# Patient Record
Sex: FEMALE | Race: WHITE | Employment: PART TIME | URBAN - METROPOLITAN AREA
[De-identification: names, ages, dates, MRNs, and addresses within clinical notes are randomized per-mention and may not be internally consistent; named-entity substitution may affect disease eponyms.]

---

## 2017-06-26 ENCOUNTER — HOSPITAL ENCOUNTER (OUTPATIENT)
Dept: ULTRASOUND IMAGING | Facility: CLINIC | Age: 47
End: 2017-06-26
Attending: OBSTETRICS & GYNECOLOGY
Payer: COMMERCIAL

## 2017-06-26 ENCOUNTER — HOSPITAL ENCOUNTER (OUTPATIENT)
Dept: MAMMOGRAPHY | Facility: CLINIC | Age: 47
Discharge: HOME | End: 2017-06-26
Attending: OBSTETRICS & GYNECOLOGY | Admitting: OBSTETRICS & GYNECOLOGY
Payer: COMMERCIAL

## 2017-06-26 DIAGNOSIS — R92.8 ABNORMAL MAMMOGRAM: ICD-10-CM

## 2017-06-26 PROCEDURE — 76642 ULTRASOUND BREAST LIMITED: CPT | Mod: RT

## 2017-06-26 PROCEDURE — G0279 TOMOSYNTHESIS, MAMMO: HCPCS

## 2019-01-11 ENCOUNTER — OFFICE VISIT (OUTPATIENT)
Dept: URGENT CARE | Facility: URGENT CARE | Age: 49
End: 2019-01-11
Payer: COMMERCIAL

## 2019-01-11 VITALS
DIASTOLIC BLOOD PRESSURE: 74 MMHG | HEIGHT: 70 IN | HEART RATE: 92 BPM | SYSTOLIC BLOOD PRESSURE: 118 MMHG | WEIGHT: 268 LBS | BODY MASS INDEX: 38.37 KG/M2 | OXYGEN SATURATION: 98 % | TEMPERATURE: 97.9 F | RESPIRATION RATE: 16 BRPM

## 2019-01-11 DIAGNOSIS — L03.031 PARONYCHIA OF TOE, RIGHT: Primary | ICD-10-CM

## 2019-01-11 PROCEDURE — 99203 OFFICE O/P NEW LOW 30 MIN: CPT | Performed by: PHYSICIAN ASSISTANT

## 2019-01-11 RX ORDER — SERTRALINE HYDROCHLORIDE 100 MG/1
100 TABLET, FILM COATED ORAL DAILY
COMMUNITY
Start: 2018-12-26 | End: 2019-12-30

## 2019-01-11 RX ORDER — DEXTROAMPHETAMINE SACCHARATE, AMPHETAMINE ASPARTATE, DEXTROAMPHETAMINE SULFATE AND AMPHETAMINE SULFATE 3.75; 3.75; 3.75; 3.75 MG/1; MG/1; MG/1; MG/1
20 TABLET ORAL 3 TIMES DAILY
COMMUNITY
Start: 2018-09-26

## 2019-01-11 RX ORDER — ARIPIPRAZOLE 10 MG/1
15 TABLET ORAL DAILY
Refills: 3 | COMMUNITY
Start: 2018-12-17

## 2019-01-11 RX ORDER — CEPHALEXIN 500 MG/1
500 CAPSULE ORAL 4 TIMES DAILY
Qty: 28 CAPSULE | Refills: 0 | Status: SHIPPED | OUTPATIENT
Start: 2019-01-11 | End: 2019-01-18

## 2019-01-11 RX ORDER — LAMOTRIGINE 100 MG/1
300 TABLET ORAL DAILY
Refills: 10 | COMMUNITY
Start: 2018-09-11

## 2019-01-11 RX ORDER — TRAZODONE HYDROCHLORIDE 150 MG/1
300 TABLET ORAL AT BEDTIME
COMMUNITY
Start: 2018-09-26

## 2019-01-11 ASSESSMENT — MIFFLIN-ST. JEOR: SCORE: 1930.89

## 2019-01-11 NOTE — PATIENT INSTRUCTIONS
Patient Education   If any worsening symptoms, redness, warmth or increased drainage start antibiotic  If symptoms do not improve at all in 2 days, start antibiotic.  Paronychia of the Finger or Toe  Paronychia is an infection near a fingernail or toenail. It usually occurs when an opening in the cuticle or an ingrown toenail lets bacteria under the skin.  The infection will need to be drained if pus is present. If the infection has been caught early, you may need only antibiotic treatment. Healing will take about 1 to 2 weeks.  Home care  Follow these guidelines when caring for yourself at home:    Clean and soak the toe or finger. Do this 2 times a day for the first 3 days. To do so:  ? Soak your foot or hand in a tub of warm water for 5 minutes. Or hold your toe or finger under a faucet of warm running water for 5 minutes.  ? Clean any crust away with soap and water using a cotton swab.  ? Put antibiotic ointment on the infected area.    Change the dressing daily or any time it gets dirty.    If you were given antibiotics, take them as directed until they are all gone.    If your infection is on a toe, wear comfortable shoes with a lot of toe room. You can also wear open-toed sandals while your toe heals.    You may use over-the-counter medicine (acetaminophen or ibuprofen to help with pain, unless another medicine was prescribed. If you have chronic liver or kidney disease, talk with your healthcare provider before using these medicines. Also talk with your provider if you've had a stomach ulcer or GI (gastrointestinal) bleeding.  Prevention  The following can prevent paronychia:    Avoid cutting or playing with your cuticles at home.    Don't bite your nails.    Don't suck on your thumbs or fingers.  Follow-up care  Follow up with your healthcare provider, or as advised.  When to seek medical advice  Call your healthcare provider right away if any of these occur:    Redness, pain, or swelling of the finger or  toe gets worse    Red streaks in the skin leading away from the wound    Pus or fluid draining from the nail area    Fever of 100.4 F (38 C) or higher, or as directed by your provider  Date Last Reviewed: 8/1/2016 2000-2018 [employer]. [address] [place], [place] [id number]. All rights reserved. This information is not intended as a substitute for professional medical care. Always follow your healthcare professional's instructions.

## 2019-01-11 NOTE — PROGRESS NOTES
"SUBJECTIVE:  Alejandrina Mack is a 48 year old female who presents complaining of right first toe pain.  She has noted some redness and swelling along the cuticle margin.  Symptoms began a week ago.   Severity: mild and symptoms are improving.  She denies any trauma to the area. She did note an ingrown toenail, which she clipped. She has been using triple antibiotic ointment and epsom salt soaks. No fevers or chills noted.  No migration of redness or swelling proximally. She has not had trauma to the area.     Past Medical History:   Diagnosis Date     Bipolar 2 disorder (H)      Current Outpatient Medications   Medication Sig Dispense Refill     ABILIFY 10 MG tablet Take 15 mg by mouth daily  3     amphetamine-dextroamphetamine (ADDERALL) 15 MG tablet Take 15 mg by mouth 3 times daily       cephALEXin (KEFLEX) 500 MG capsule Take 1 capsule (500 mg) by mouth 4 times daily for 7 days 28 capsule 0     lamoTRIgine (LAMICTAL) 100 MG tablet Take 300 mg by mouth At Bedtime  10     levonorgestrel (MIRENA) 20 MCG/24HR IUD 1 each by Intrauterine route once       sertraline (ZOLOFT) 100 MG tablet Take 100 mg by mouth daily       traZODone (DESYREL) 150 MG tablet Take 300 mg by mouth At Bedtime       Social History     Tobacco Use     Smoking status: Never Smoker     Smokeless tobacco: Never Used   Substance Use Topics     Alcohol use: Yes     Comment: rarely       ROS:  As stated above    OBJECTIVE:  /74 (BP Location: Right arm, Patient Position: Chair, Cuff Size: Adult Large)   Pulse 92   Temp 97.9  F (36.6  C) (Tympanic)   Resp 16   Ht 1.778 m (5' 10\")   Wt 121.6 kg (268 lb)   LMP  (LMP Unknown)   SpO2 98%   Breastfeeding? No   BMI 38.45 kg/m    General: WDWN  Hand exam:  examination of first great toe reveals paronychia with redness, tenderness and swelling along distal finger. No drainage is noted.     ASSESSMENT / PLAN:  1. Paronychia of toe, right  Paronychia is responding well to supportive cares. " Encouraged her to continue soaking and using abx ointment. A written rx was given for her to take if symptoms worsen (cellulitis, fever) or if symptoms do not improve. Patient understands and agrees with treatment plan.   - cephALEXin (KEFLEX) 500 MG capsule; Take 1 capsule (500 mg) by mouth 4 times daily for 7 days  Dispense: 28 capsule; Refill: 0    I have discussed the patient's diagnosis and my plan of treatment with the patient. Patient is aware to come back in with worsening symptoms or if no relief despite treatment plan.  Patient verbalizes understanding. All questions were addressed and answered.     Mariana Soto PA-C

## 2019-09-25 ENCOUNTER — HOSPITAL ENCOUNTER (OUTPATIENT)
Dept: MAMMOGRAPHY | Facility: CLINIC | Age: 49
End: 2019-09-25
Attending: SPECIALIST
Payer: COMMERCIAL

## 2019-09-25 ENCOUNTER — HOSPITAL ENCOUNTER (OUTPATIENT)
Dept: ULTRASOUND IMAGING | Facility: CLINIC | Age: 49
Discharge: HOME | End: 2019-09-25
Attending: SPECIALIST | Admitting: SPECIALIST
Payer: COMMERCIAL

## 2019-09-25 ENCOUNTER — HOSPITAL ENCOUNTER (OUTPATIENT)
Dept: ULTRASOUND IMAGING | Facility: CLINIC | Age: 49
End: 2019-09-25
Attending: SPECIALIST
Payer: COMMERCIAL

## 2019-09-25 DIAGNOSIS — R92.8 ABNORMAL MAMMOGRAM: ICD-10-CM

## 2019-09-25 PROCEDURE — 25000125 ZZHC RX 250: Performed by: RADIOLOGY

## 2019-09-25 PROCEDURE — 76642 ULTRASOUND BREAST LIMITED: CPT | Mod: RT

## 2019-09-25 PROCEDURE — 40000986 MA POST PROCEDURE RIGHT

## 2019-09-25 PROCEDURE — 88377 M/PHMTRC ALYS ISHQUANT/SEMIQ: CPT | Performed by: SPECIALIST

## 2019-09-25 PROCEDURE — 88342 IMHCHEM/IMCYTCHM 1ST ANTB: CPT | Mod: 26 | Performed by: SPECIALIST

## 2019-09-25 PROCEDURE — 88360 TUMOR IMMUNOHISTOCHEM/MANUAL: CPT | Mod: 26,59 | Performed by: SPECIALIST

## 2019-09-25 PROCEDURE — 88305 TISSUE EXAM BY PATHOLOGIST: CPT | Performed by: SPECIALIST

## 2019-09-25 PROCEDURE — 27210206 US BREAST BIOPSY CORE NEEDLE RIGHT

## 2019-09-25 PROCEDURE — 00000158 ZZHCL STATISTIC H-FISH PROCESS B/S: Performed by: SPECIALIST

## 2019-09-25 PROCEDURE — 88305 TISSUE EXAM BY PATHOLOGIST: CPT | Mod: 26 | Performed by: SPECIALIST

## 2019-09-25 PROCEDURE — 88342 IMHCHEM/IMCYTCHM 1ST ANTB: CPT | Performed by: SPECIALIST

## 2019-09-25 PROCEDURE — 00000159 ZZHCL STATISTIC H-SEND OUTS PREP: Performed by: SPECIALIST

## 2019-09-25 PROCEDURE — 88360 TUMOR IMMUNOHISTOCHEM/MANUAL: CPT | Performed by: SPECIALIST

## 2019-09-25 RX ADMIN — LIDOCAINE HYDROCHLORIDE 3 ML: 10 INJECTION, SOLUTION EPIDURAL; INFILTRATION; INTRACAUDAL; PERINEURAL at 14:23

## 2019-09-25 NOTE — DISCHARGE INSTRUCTIONS
Page 1 of 1  For informational purposes only. Not to replace the advice of your health care provider. Copyright   2010 [facility]. All rights reserved. [employer] [id number] - REV 02/16.  After Your Breast Biopsy   Bleeding or bruising  Slight bruising is normal. If you bleed through the bandage, put direct pressure on the breast for 10 minutes.   If the breast begins to swell, or you have a lot of bleeding after 10 minutes of pressure, call the doctor who ordered your exam. Or, go to the emergency room.   Bandages  Keep your bandage in place until tomorrow morning. Do not get it wet.   If you have small pieces of tape on the skin, leave them in place. They will fall off on their own, or you can remove them after 5 days.   Activity  You may shower the morning after the exam. No heavy activity (lifting, vacuuming) on the day of your exam. You may go back to normal activity the next day, unless you had a lot of bleeding or pain.  Discomfort  You may take Tylenol (acetaminophen) today for pain. Tomorrow, you may take an anti-inflammatory medicine (aspirin, ibuprofen, Motrin, Aleve, Advil), unless your doctor tells you not to.  Wear your bra overnight to support the breast. You may also use an ice pack: Place it over the area for 15-20 minutes several times a day.  Infection  Infection is rare. Symptoms include fever, redness, increasing pain and fluid draining from the biopsy site. If you have any of these symptoms, please call the doctor who ordered your exam.  Results  Results may take up to 5 business days. A nurse or doctor from the Breast Center will call with your results. We will also send the results to the doctor who ordered your biopsy.  If you have not heard your results in 5 days, please call the Breast Center.   Other instructions  ______________________________________________________________________________________________________________________________  Call your doctor if:    You have  bleeding that lasts more than 10 minutes.    You have pain that cannot be controlled.   You have signs of infection (fever, redness, drainage or other signs).   You have not received your results within 5 days.    Please call the Breast Center nurse navigator at [phone] if you have questions or concerns about your biopsy.

## 2019-09-27 ENCOUNTER — TELEPHONE (OUTPATIENT)
Dept: MAMMOGRAPHY | Facility: CLINIC | Age: 49
End: 2019-09-27

## 2019-09-27 NOTE — TELEPHONE ENCOUNTER
MALIGNANT path  Pathology report reviewed with breast radiologist Brad.  I phoned and informed patient of results showing Invasive lobular carcinoma.  Patient states no problems with biopsy site.  Recommended follow up is surgical consult and MRI.   Surgical Consult has been arranged with  Dr Ambriz on 9-30-19 at 0930 arriving at 0900.   Patient has directions and phone numbers.  Questions were answered and I explained my role as Nurse Navigator in assisting her with appointments, resources and social support. New diagnosis information packet will be available at consult. I will follow up with the patient. She has my phone number if she has further questions. Ordering provider notified.

## 2019-09-28 LAB — COPATH REPORT: NORMAL

## 2019-09-30 ENCOUNTER — TELEPHONE (OUTPATIENT)
Dept: SURGERY | Facility: CLINIC | Age: 49
End: 2019-09-30

## 2019-09-30 ENCOUNTER — OFFICE VISIT (OUTPATIENT)
Dept: SURGERY | Facility: CLINIC | Age: 49
End: 2019-09-30
Payer: COMMERCIAL

## 2019-09-30 ENCOUNTER — PREP FOR PROCEDURE (OUTPATIENT)
Dept: SURGERY | Facility: CLINIC | Age: 49
End: 2019-09-30

## 2019-09-30 VITALS
HEIGHT: 70 IN | WEIGHT: 268 LBS | DIASTOLIC BLOOD PRESSURE: 84 MMHG | RESPIRATION RATE: 16 BRPM | HEART RATE: 106 BPM | BODY MASS INDEX: 38.37 KG/M2 | OXYGEN SATURATION: 95 % | SYSTOLIC BLOOD PRESSURE: 122 MMHG

## 2019-09-30 DIAGNOSIS — Z17.0 MALIGNANT NEOPLASM OF UPPER-OUTER QUADRANT OF RIGHT BREAST IN FEMALE, ESTROGEN RECEPTOR POSITIVE (H): ICD-10-CM

## 2019-09-30 DIAGNOSIS — C50.411 MALIGNANT NEOPLASM OF UPPER-OUTER QUADRANT OF RIGHT BREAST IN FEMALE, ESTROGEN RECEPTOR POSITIVE (H): ICD-10-CM

## 2019-09-30 DIAGNOSIS — C50.911 MALIGNANT NEOPLASM OF RIGHT FEMALE BREAST, UNSPECIFIED ESTROGEN RECEPTOR STATUS, UNSPECIFIED SITE OF BREAST (H): Primary | ICD-10-CM

## 2019-09-30 PROCEDURE — 99202 OFFICE O/P NEW SF 15 MIN: CPT | Performed by: SURGERY

## 2019-09-30 ASSESSMENT — MIFFLIN-ST. JEOR: SCORE: 1925.89

## 2019-09-30 NOTE — TELEPHONE ENCOUNTER
Type of surgery: RIGHT BREAST SEED LOCALIZED LUMPECTOMY WITH RIGHT SENTINEL NODE BIOPSY, POSSIBLE RIGHT AXILLARY NODE DISSECTION   Location of surgery: [facility]  Date and time of surgery: 10-11-19, 10:45 AM   Surgeon: DR. CABEZAS   Pre-Op Appt Date: PATIENT TO SCHEDULE   Post-Op Appt Date: PATIENT TO SCHEDULE    Packet sent out: GIVEN TO PATIENT   Pre-cert/Authorization completed:  Not Applicable  Date: 9-30-19     RIGHT BREAST SEED LOCALIZED LUMPECTOMY WITH RIGHT SENTINEL NODE BIOPSY, POSSIBLE RIGHT AXILLARY NODE DISSECTION   GENERAL   PT INST TO HAVE H&P WITH DR. ALFREDO  90 MINS REQ  PA ASSIST DJM   ALW   R Seed loc at 8:45 am  alw   R Sn Inj at 9:45 am  alw

## 2019-09-30 NOTE — PROGRESS NOTES
"HPI:  She is here with her  and mother  She works as a   right breast mass noted on screening mammogram.      Skin rashes, dimpling or nipple changes:  none  Nipple discharge:   none  Does perform self breast exams.  Previous breast biopsies: No  Previous cyst aspiration: No  Previous other breast surgery: No    Family History   Problem Relation Age of Onset     Breast Cancer Maternal Grandmother 86     Diabetes Paternal Grandfather      Family history of breast cancer: Yes - MGM, ost menopausal  Family history of colon cancer: No  Family history of pancreatic cancer: No  Family history of prostate cancer: No    Mammography reveals: \"possible developing focal asymmetry with architectural distortion right breast\"    US reveals: right breast:  a nodule measuring 0.8 cm at the 10 o'clock position and 9 cm from the nipple, normal-appearing lymph nodes in the axilla.    Percutaneous core needle biopsy reveals: invasive lobular carcinoma, grade 1, ER & MA POSITIVE, HER-2 NEGATIVE and LCIS.  This pathology was reviewed with the patient and her family    Past Medical History:   has a past medical history of Bipolar 2 disorder (H).    Past Surgical History:  No past surgical history on file.     Social History:  Social History     Socioeconomic History     Marital status:      Spouse name: Not on file     Number of children: Not on file     Years of education: Not on file     Highest education level: Not on file   Occupational History     Not on file   Social Needs     Financial resource strain: Not on file     Food insecurity:     Worry: Not on file     Inability: Not on file     Transportation needs:     Medical: Not on file     Non-medical: Not on file   Tobacco Use     Smoking status: Never Smoker     Smokeless tobacco: Never Used   Substance and Sexual Activity     Alcohol use: Yes     Comment: rarely     Drug use: No     Sexual activity: Yes     Partners: Male     Birth " control/protection: I.U.D.   Lifestyle     Physical activity:     Days per week: Not on file     Minutes per session: Not on file     Stress: Not on file   Relationships     Social connections:     Talks on phone: Not on file     Gets together: Not on file     Attends [employer] service: Not on file     Active member of club or organization: Not on file     Attends meetings of clubs or organizations: Not on file     Relationship status: Not on file     Intimate partner violence:     Fear of current or ex partner: Not on file     Emotionally abused: Not on file     Physically abused: Not on file     Forced sexual activity: Not on file   Other Topics Concern     Not on file   Social History Narrative     Not on file       PE:  Vitals: There were no vitals taken for this visit.  General appearance: well-nourished, sitting comfortably, no apparent distress  Neck: Supple without thyromegaly, lymphadenopathy, masses  Lungs: Respirations unlabored  Abdomen: soft, nondistended  Extremities: Without edema  Neurologic: nonfocal, grossly intact times four extremities, alert and oriented times three  Psychiatric: Mood and affect are appropriate  Skin: Without lesions or rashes  Breast:     Masses- none    Ecchymosis- none   Incisional scar- none    Axillae:   Palpable adenopathy: none    Assessment: Right breast cancer-invasive lobular, ER and KS positive, HER-2 negative    PLAN:  Discussed options for and treatment of his breast cancer were discussed in detail.  We discussed, compared and contrasted lumpectomy with radiation and mastectomy with or without reconstruction.  Currently she is considering lumpectomy versus mastectomy but no reconstruction.  We discussed radiation therapy, cosmetic changes to the breast, changes from radiation therapy, recurrence risk and surveillance as well as reconstruction options.  We discussed sentinel node biopsy with axillary node dissection if the sentinel node is significantly positive or  fails to localize.  We discussed placement of a drain, lymphedema, axillary numbness and pain.  Due to her young age, we also discussed genetic testing, this would need to be done after an appointment with a genetic counselor and these results would likely not be available at the time of her initial surgery.  We discussed oncology referral and she would like to see Dr. Keke Shah at [facility], I have contacted her office so they can contact the patient for an intake evaluation.    Today, we will order an MRI scan as recommended by radiology and tentatively schedule surgery for next week.    Pradip Ambriz MD    Please route or send letter to:  Primary Care Provider (PCP) and Include Progress Note

## 2019-09-30 NOTE — PROGRESS NOTES
Breast Patients      BREAST PATIENTS (FEMALE)    At what age did your periods begin? 12-13 yrs     What was the date of your last menstrual period? IUD placed     Have you begun menopause? No    Are you using hormone replacement therapy?  No    Number of full-term pregnancies: 2    Did you nurse your children? Yes    Are you pregnant now? No    Do you have breast implants? No         BREAST PATIENTS (ALL)    Have you had a previous breast biopsy? Yes  Side: -  Date: 2015    Have you had previous Breast Cancer? No

## 2019-09-30 NOTE — PATIENT INSTRUCTIONS
BILATERAL BREAST MRI    Date: 10-2-19  Time: 2:00 pm     Location: [facility]  [id number] [place] [address]  [id number]        Please check in at 1:30 pm

## 2019-09-30 NOTE — LETTER
"2019       Re: Aljeandrina Mack - 1970    She is here with her  and mother  She works as a   right breast mass noted on screening mammogram.       Skin rashes, dimpling or nipple changes:  none  Nipple discharge:   none  Does perform self breast exams.  Previous breast biopsies: No  Previous cyst aspiration: No  Previous other breast surgery: No     Family history of breast cancer: Yes - MGM, ost menopausal  Family history of colon cancer: No  Family history of pancreatic cancer: No  Family history of prostate cancer: No     Mammography reveals: \"possible developing focal asymmetry with architectural distortion right breast\"     US reveals: right breast:  a nodule measuring 0.8 cm at the 10 o'clock position and 9 cm from the nipple, normal-appearing lymph nodes in the axilla.     Percutaneous core needle biopsy reveals: invasive lobular carcinoma, grade 1, ER & CO POSITIVE, HER-2 NEGATIVE and LCIS.  This pathology was reviewed with the patient and her family     Past Medical History:  has a past medical history of Bipolar 2 disorder (H).      PE:  Vitals: There were no vitals taken for this visit.  General appearance: well-nourished, sitting comfortably, no apparent distress  Neck: Supple without thyromegaly, lymphadenopathy, masses  Lungs: Respirations unlabored  Abdomen: soft, nondistended  Extremities: Without edema  Neurologic: nonfocal, grossly intact times four extremities, alert and oriented times three  Psychiatric: Mood and affect are appropriate  Skin: Without lesions or rashes  Breast:               Masses- none               Ecchymosis- none              Incisional scar- none     Axillae:   Palpable adenopathy: none     Assessment: Right breast cancer-invasive lobular, ER and CO positive, HER-2 negative     PLAN:  Discussed options for and treatment of his breast cancer were discussed in detail.  We discussed, compared and contrasted lumpectomy with radiation " and mastectomy with or without reconstruction.  Currently she is considering lumpectomy versus mastectomy but no reconstruction. We discussed radiation therapy, cosmetic changes to the breast, changes from radiation therapy, recurrence risk and surveillance as well as reconstruction options.  We discussed sentinel node biopsy with axillary node dissection if the sentinel node is significantly positive or fails to localize.  We discussed placement of a drain, lymphedema, axillary numbness and pain.  Due to her young age, we also discussed genetic testing, this would need to be done after an appointment with a genetic counselor and these results would likely not be available at the time of her initial surgery.  We discussed oncology referral and she would like to see Dr. Keke Shah at [facility], I have contacted her office so they can contact the patient for an intake evaluation.     Today, we will order an MRI scan as recommended by radiology and tentatively schedule surgery for next week.     Giles Ambriz MD

## 2019-10-01 LAB — COPATH REPORT: NORMAL

## 2019-10-02 ENCOUNTER — TRANSFERRED RECORDS (OUTPATIENT)
Dept: SURGERY | Facility: CLINIC | Age: 49
End: 2019-10-02

## 2019-10-02 ENCOUNTER — HOSPITAL ENCOUNTER (OUTPATIENT)
Dept: MRI IMAGING | Facility: CLINIC | Age: 49
Discharge: HOME | End: 2019-10-02
Attending: SURGERY | Admitting: SURGERY
Payer: COMMERCIAL

## 2019-10-02 DIAGNOSIS — C50.911 MALIGNANT NEOPLASM OF RIGHT FEMALE BREAST, UNSPECIFIED ESTROGEN RECEPTOR STATUS, UNSPECIFIED SITE OF BREAST (H): ICD-10-CM

## 2019-10-02 PROCEDURE — 77049 MRI BREAST C-+ W/CAD BI: CPT

## 2019-10-02 PROCEDURE — A9585 GADOBUTROL INJECTION: HCPCS | Performed by: SURGERY

## 2019-10-02 PROCEDURE — 25500064 ZZH RX 255 OP 636: Performed by: SURGERY

## 2019-10-02 RX ORDER — GADOBUTROL 604.72 MG/ML
15 INJECTION INTRAVENOUS ONCE
Status: COMPLETED | OUTPATIENT
Start: 2019-10-02 | End: 2019-10-02

## 2019-10-02 RX ADMIN — GADOBUTROL 12 ML: 604.72 INJECTION INTRAVENOUS at 13:54

## 2019-10-03 ENCOUNTER — TELEPHONE (OUTPATIENT)
Dept: MAMMOGRAPHY | Facility: CLINIC | Age: 49
End: 2019-10-03

## 2019-10-03 NOTE — TELEPHONE ENCOUNTER
Ms. Mack called the [facility] [place] regarding questions related to a MRI guided Breast Biopsy.  I explained the process and procedure involved with a MRI Guided breast biopsy.  I also gave Alejandrina the check in location for the Breast Center in [place], in the event her MD orders the biopsy. All questions answered.

## 2019-10-03 NOTE — RESULT ENCOUNTER NOTE
Patient was notified of these results.  Discussed these results with patient.  Also spoke with her oncologist, Dr. Shah.  The area of non-masslike enhancement on the right side would require a mastectomy for its complete removal.  However, since this is not definitively malignancy, she would like to proceed with lumpectomy and pathologic evaluation of some of the non-masslike enhancement which will be removed at the time of her lumpectomy surgery.  If this does indeed show malignancy, she may then need to undergo mastectomy.  She is comfortable with this plan.  Regarding the left breast, she is in the process of scheduling the MRI guided biopsy.  There is no time for her at [facility] so other sites are being queried.      Giles Ambriz MD  10/3/2019 1:21 PM

## 2019-10-08 ENCOUNTER — ANCILLARY PROCEDURE (OUTPATIENT)
Dept: MAMMOGRAPHY | Facility: CLINIC | Age: 49
End: 2019-10-08
Attending: INTERNAL MEDICINE
Payer: COMMERCIAL

## 2019-10-08 ENCOUNTER — ANCILLARY PROCEDURE (OUTPATIENT)
Dept: MRI IMAGING | Facility: CLINIC | Age: 49
End: 2019-10-08
Attending: INTERNAL MEDICINE
Payer: COMMERCIAL

## 2019-10-08 ENCOUNTER — ANESTHESIA EVENT (OUTPATIENT)
Dept: SURGERY | Facility: CLINIC | Age: 49
End: 2019-10-08
Payer: COMMERCIAL

## 2019-10-08 DIAGNOSIS — C50.912 MALIGNANT NEOPLASM OF LEFT FEMALE BREAST, UNSPECIFIED ESTROGEN RECEPTOR STATUS, UNSPECIFIED SITE OF BREAST (H): ICD-10-CM

## 2019-10-08 RX ORDER — LIDOCAINE HYDROCHLORIDE AND EPINEPHRINE 10; 10 MG/ML; UG/ML
10 INJECTION, SOLUTION INFILTRATION; PERINEURAL ONCE
Status: COMPLETED | OUTPATIENT
Start: 2019-10-08 | End: 2019-10-08

## 2019-10-08 RX ORDER — LIDOCAINE HYDROCHLORIDE 10 MG/ML
10 INJECTION, SOLUTION EPIDURAL; INFILTRATION; INTRACAUDAL; PERINEURAL ONCE
Status: COMPLETED | OUTPATIENT
Start: 2019-10-08 | End: 2019-10-08

## 2019-10-08 RX ORDER — GADOBUTROL 604.72 MG/ML
15 INJECTION INTRAVENOUS ONCE
Status: COMPLETED | OUTPATIENT
Start: 2019-10-08 | End: 2019-10-08

## 2019-10-08 RX ADMIN — GADOBUTROL 13 ML: 604.72 INJECTION INTRAVENOUS at 08:01

## 2019-10-08 RX ADMIN — LIDOCAINE HYDROCHLORIDE 10 ML: 10 INJECTION, SOLUTION EPIDURAL; INFILTRATION; INTRACAUDAL; PERINEURAL at 08:30

## 2019-10-08 RX ADMIN — LIDOCAINE HYDROCHLORIDE AND EPINEPHRINE 20 ML: 10; 10 INJECTION, SOLUTION INFILTRATION; PERINEURAL at 08:30

## 2019-10-08 NOTE — DISCHARGE INSTRUCTIONS
MRI Contrast Discharge Instructions    The IV contrast you received today will pass out of your body in your  urine. This will happen in the next 24 hours. You will not feel this process.  Your urine will not change color.    Drink at least 4 extra glasses of water or juice today (unless your doctor  has restricted your fluids). This reduces the stress on your kidneys.  You may take your regular medicines.    If you are on dialysis: It is best to have dialysis today.    If you have a reaction: Most reactions happen right away. If you have  any new symptoms after leaving the hospital (such as hives or swelling),  call your hospital at the correct number below. Or call your family doctor.  If you have breathing distress or wheezing, call 911.    Special instructions: ***    I have read and understand the above information.    Signature:______________________________________ Date:___________    Staff:__________________________________________ Date:___________     Time:__________    [facility] Departments:    ___[place]: [phone]  ___[facility]: [phone]  ___[place]: [phone] ___[facility]: [phone]  ___[facility]: [phone]  ___[facility]: [phone]  ___[phone]  ___[facility]: [phone]  ___[phone]

## 2019-10-09 ENCOUNTER — TRANSFERRED RECORDS (OUTPATIENT)
Dept: HEALTH INFORMATION MANAGEMENT | Facility: CLINIC | Age: 49
End: 2019-10-09

## 2019-10-09 LAB — COPATH REPORT: NORMAL

## 2019-10-10 ENCOUNTER — TELEPHONE (OUTPATIENT)
Dept: MAMMOGRAPHY | Facility: CLINIC | Age: 49
End: 2019-10-10

## 2019-10-10 NOTE — TELEPHONE ENCOUNTER
Spoke to Alejandrina about the benign findings from her left breast biopsy done earlier this week.  We discussed the Radiologist's recommendation of continuing on with her current treatment plan for her known right breast cancer.  Alejandrina verbalized understanding and all questions and concerns were answered at this time.

## 2019-10-11 ENCOUNTER — HOSPITAL ENCOUNTER (OUTPATIENT)
Dept: MAMMOGRAPHY | Facility: CLINIC | Age: 49
End: 2019-10-11
Attending: SURGERY | Admitting: SURGERY
Payer: COMMERCIAL

## 2019-10-11 ENCOUNTER — ANESTHESIA (OUTPATIENT)
Dept: SURGERY | Facility: CLINIC | Age: 49
End: 2019-10-11
Payer: COMMERCIAL

## 2019-10-11 ENCOUNTER — HOSPITAL ENCOUNTER (OUTPATIENT)
Dept: ULTRASOUND IMAGING | Facility: CLINIC | Age: 49
End: 2019-10-11
Attending: SURGERY | Admitting: SURGERY
Payer: COMMERCIAL

## 2019-10-11 ENCOUNTER — APPOINTMENT (OUTPATIENT)
Dept: MAMMOGRAPHY | Facility: CLINIC | Age: 49
End: 2019-10-11
Attending: SURGERY
Payer: COMMERCIAL

## 2019-10-11 ENCOUNTER — HOSPITAL ENCOUNTER (OUTPATIENT)
Dept: NUCLEAR MEDICINE | Facility: CLINIC | Age: 49
Setting detail: NUCLEAR MEDICINE
End: 2019-10-11
Attending: SURGERY
Payer: COMMERCIAL

## 2019-10-11 ENCOUNTER — HOSPITAL ENCOUNTER (OUTPATIENT)
Facility: CLINIC | Age: 49
Discharge: HOME | End: 2019-10-11
Attending: SURGERY | Admitting: SURGERY
Payer: COMMERCIAL

## 2019-10-11 ENCOUNTER — TELEPHONE (OUTPATIENT)
Dept: MAMMOGRAPHY | Facility: CLINIC | Age: 49
End: 2019-10-11

## 2019-10-11 ENCOUNTER — APPOINTMENT (OUTPATIENT)
Dept: SURGERY | Facility: PHYSICIAN GROUP | Age: 49
End: 2019-10-11
Payer: COMMERCIAL

## 2019-10-11 VITALS
SYSTOLIC BLOOD PRESSURE: 142 MMHG | HEART RATE: 94 BPM | RESPIRATION RATE: 14 BRPM | WEIGHT: 268 LBS | HEIGHT: 70 IN | OXYGEN SATURATION: 98 % | BODY MASS INDEX: 38.37 KG/M2 | DIASTOLIC BLOOD PRESSURE: 88 MMHG | TEMPERATURE: 97.7 F

## 2019-10-11 DIAGNOSIS — C50.911 MALIGNANT NEOPLASM OF RIGHT FEMALE BREAST, UNSPECIFIED ESTROGEN RECEPTOR STATUS, UNSPECIFIED SITE OF BREAST (H): ICD-10-CM

## 2019-10-11 DIAGNOSIS — Z17.0 MALIGNANT NEOPLASM OF UPPER-OUTER QUADRANT OF RIGHT BREAST IN FEMALE, ESTROGEN RECEPTOR POSITIVE (H): Primary | ICD-10-CM

## 2019-10-11 DIAGNOSIS — C50.411 MALIGNANT NEOPLASM OF UPPER-OUTER QUADRANT OF RIGHT BREAST IN FEMALE, ESTROGEN RECEPTOR POSITIVE (H): Primary | ICD-10-CM

## 2019-10-11 LAB — HCG UR QL: NEGATIVE

## 2019-10-11 PROCEDURE — 40000306 ZZH STATISTIC PRE PROC ASSESS II: Performed by: SURGERY

## 2019-10-11 PROCEDURE — 38900 IO MAP OF SENT LYMPH NODE: CPT | Performed by: SURGERY

## 2019-10-11 PROCEDURE — 88332 PATH CONSLTJ SURG EA ADD BLK: CPT | Mod: 26 | Performed by: SURGERY

## 2019-10-11 PROCEDURE — 25000125 ZZHC RX 250: Performed by: SURGERY

## 2019-10-11 PROCEDURE — 88307 TISSUE EXAM BY PATHOLOGIST: CPT | Mod: 26,59 | Performed by: SURGERY

## 2019-10-11 PROCEDURE — 25000128 H RX IP 250 OP 636: Performed by: NURSE ANESTHETIST, CERTIFIED REGISTERED

## 2019-10-11 PROCEDURE — 27210794 ZZH OR GENERAL SUPPLY STERILE: Performed by: SURGERY

## 2019-10-11 PROCEDURE — 25000125 ZZHC RX 250: Performed by: NURSE ANESTHETIST, CERTIFIED REGISTERED

## 2019-10-11 PROCEDURE — 71000012 ZZH RECOVERY PHASE 1 LEVEL 1 FIRST HR: Performed by: SURGERY

## 2019-10-11 PROCEDURE — 88307 TISSUE EXAM BY PATHOLOGIST: CPT | Performed by: SURGERY

## 2019-10-11 PROCEDURE — 36000058 ZZH SURGERY LEVEL 3 EA 15 ADDTL MIN: Performed by: SURGERY

## 2019-10-11 PROCEDURE — 88331 PATH CONSLTJ SURG 1 BLK 1SPC: CPT | Mod: 26 | Performed by: SURGERY

## 2019-10-11 PROCEDURE — 71000027 ZZH RECOVERY PHASE 2 EACH 15 MINS: Performed by: SURGERY

## 2019-10-11 PROCEDURE — 25800030 ZZH RX IP 258 OP 636: Performed by: ANESTHESIOLOGY

## 2019-10-11 PROCEDURE — 00000093 ZZHCL STATISTIC COURTESY CONSULT: Performed by: SURGERY

## 2019-10-11 PROCEDURE — 19285 PERQ DEV BREAST 1ST US IMAG: CPT | Mod: RT

## 2019-10-11 PROCEDURE — A9520 TC99 TILMANOCEPT DIAG 0.5MCI: HCPCS | Performed by: SURGERY

## 2019-10-11 PROCEDURE — 38525 BIOPSY/REMOVAL LYMPH NODES: CPT | Mod: RT | Performed by: SURGERY

## 2019-10-11 PROCEDURE — 88342 IMHCHEM/IMCYTCHM 1ST ANTB: CPT | Performed by: SURGERY

## 2019-10-11 PROCEDURE — 40000986 MA POST PROCEDURE RIGHT

## 2019-10-11 PROCEDURE — 37000008 ZZH ANESTHESIA TECHNICAL FEE, 1ST 30 MIN: Performed by: SURGERY

## 2019-10-11 PROCEDURE — 88342 IMHCHEM/IMCYTCHM 1ST ANTB: CPT | Mod: 26 | Performed by: SURGERY

## 2019-10-11 PROCEDURE — 25000128 H RX IP 250 OP 636: Performed by: SURGERY

## 2019-10-11 PROCEDURE — 36000060 ZZH SURGERY LEVEL 3 W FLUORO 1ST 30 MIN: Performed by: SURGERY

## 2019-10-11 PROCEDURE — 88329 PATH CONSLTJ DRG SURG: CPT | Performed by: SURGERY

## 2019-10-11 PROCEDURE — 19301 PARTIAL MASTECTOMY: CPT | Mod: RT | Performed by: SURGERY

## 2019-10-11 PROCEDURE — 38792 RA TRACER ID OF SENTINL NODE: CPT

## 2019-10-11 PROCEDURE — 40000268 MA BREAST SPECIMEN RIGHT OR

## 2019-10-11 PROCEDURE — 37000009 ZZH ANESTHESIA TECHNICAL FEE, EACH ADDTL 15 MIN: Performed by: SURGERY

## 2019-10-11 PROCEDURE — 88332 PATH CONSLTJ SURG EA ADD BLK: CPT | Performed by: SURGERY

## 2019-10-11 PROCEDURE — 81025 URINE PREGNANCY TEST: CPT | Performed by: ANESTHESIOLOGY

## 2019-10-11 PROCEDURE — 25000132 ZZH RX MED GY IP 250 OP 250 PS 637: Performed by: SURGERY

## 2019-10-11 PROCEDURE — 34300033 ZZH RX 343: Performed by: SURGERY

## 2019-10-11 PROCEDURE — 88331 PATH CONSLTJ SURG 1 BLK 1SPC: CPT | Performed by: SURGERY

## 2019-10-11 RX ORDER — ONDANSETRON 2 MG/ML
4 INJECTION INTRAMUSCULAR; INTRAVENOUS EVERY 30 MIN PRN
Status: DISCONTINUED | OUTPATIENT
Start: 2019-10-11 | End: 2019-10-11 | Stop reason: HOSPADM

## 2019-10-11 RX ORDER — KETOROLAC TROMETHAMINE 30 MG/ML
INJECTION, SOLUTION INTRAMUSCULAR; INTRAVENOUS PRN
Status: DISCONTINUED | OUTPATIENT
Start: 2019-10-11 | End: 2019-10-11

## 2019-10-11 RX ORDER — BUPIVACAINE HYDROCHLORIDE AND EPINEPHRINE 2.5; 5 MG/ML; UG/ML
INJECTION, SOLUTION EPIDURAL; INFILTRATION; INTRACAUDAL; PERINEURAL PRN
Status: DISCONTINUED | OUTPATIENT
Start: 2019-10-11 | End: 2019-10-11 | Stop reason: HOSPADM

## 2019-10-11 RX ORDER — HYDRALAZINE HYDROCHLORIDE 20 MG/ML
2.5-5 INJECTION INTRAMUSCULAR; INTRAVENOUS EVERY 10 MIN PRN
Status: DISCONTINUED | OUTPATIENT
Start: 2019-10-11 | End: 2019-10-11 | Stop reason: HOSPADM

## 2019-10-11 RX ORDER — MEPERIDINE HYDROCHLORIDE 50 MG/ML
12.5 INJECTION INTRAMUSCULAR; INTRAVENOUS; SUBCUTANEOUS EVERY 5 MIN PRN
Status: DISCONTINUED | OUTPATIENT
Start: 2019-10-11 | End: 2019-10-11 | Stop reason: HOSPADM

## 2019-10-11 RX ORDER — HYDROMORPHONE HYDROCHLORIDE 1 MG/ML
.3-.5 INJECTION, SOLUTION INTRAMUSCULAR; INTRAVENOUS; SUBCUTANEOUS EVERY 5 MIN PRN
Status: DISCONTINUED | OUTPATIENT
Start: 2019-10-11 | End: 2019-10-11 | Stop reason: HOSPADM

## 2019-10-11 RX ORDER — DIAZEPAM 10 MG/2ML
2.5 INJECTION, SOLUTION INTRAMUSCULAR; INTRAVENOUS
Status: DISCONTINUED | OUTPATIENT
Start: 2019-10-11 | End: 2019-10-11 | Stop reason: HOSPADM

## 2019-10-11 RX ORDER — CEFAZOLIN SODIUM IN 0.9 % NACL 3 G/100 ML
3 INTRAVENOUS SOLUTION, PIGGYBACK (ML) INTRAVENOUS
Status: COMPLETED | OUTPATIENT
Start: 2019-10-11 | End: 2019-10-11

## 2019-10-11 RX ORDER — PROPOFOL 10 MG/ML
INJECTION, EMULSION INTRAVENOUS CONTINUOUS PRN
Status: DISCONTINUED | OUTPATIENT
Start: 2019-10-11 | End: 2019-10-11

## 2019-10-11 RX ORDER — OXYCODONE HYDROCHLORIDE 5 MG/1
5 TABLET ORAL
Status: COMPLETED | OUTPATIENT
Start: 2019-10-11 | End: 2019-10-11

## 2019-10-11 RX ORDER — ISOSULFAN BLUE 50 MG/5ML
5 INJECTION, SOLUTION SUBCUTANEOUS ONCE
Status: COMPLETED | OUTPATIENT
Start: 2019-10-11 | End: 2019-10-11

## 2019-10-11 RX ORDER — NALOXONE HYDROCHLORIDE 0.4 MG/ML
.1-.4 INJECTION, SOLUTION INTRAMUSCULAR; INTRAVENOUS; SUBCUTANEOUS
Status: DISCONTINUED | OUTPATIENT
Start: 2019-10-11 | End: 2019-10-11 | Stop reason: HOSPADM

## 2019-10-11 RX ORDER — SODIUM CHLORIDE, SODIUM LACTATE, POTASSIUM CHLORIDE, CALCIUM CHLORIDE 600; 310; 30; 20 MG/100ML; MG/100ML; MG/100ML; MG/100ML
INJECTION, SOLUTION INTRAVENOUS CONTINUOUS
Status: DISCONTINUED | OUTPATIENT
Start: 2019-10-11 | End: 2019-10-11 | Stop reason: HOSPADM

## 2019-10-11 RX ORDER — FENTANYL CITRATE 50 UG/ML
25-50 INJECTION, SOLUTION INTRAMUSCULAR; INTRAVENOUS
Status: DISCONTINUED | OUTPATIENT
Start: 2019-10-11 | End: 2019-10-11 | Stop reason: HOSPADM

## 2019-10-11 RX ORDER — ONDANSETRON 2 MG/ML
INJECTION INTRAMUSCULAR; INTRAVENOUS PRN
Status: DISCONTINUED | OUTPATIENT
Start: 2019-10-11 | End: 2019-10-11

## 2019-10-11 RX ORDER — CEFAZOLIN SODIUM 1 G/3ML
1 INJECTION, POWDER, FOR SOLUTION INTRAMUSCULAR; INTRAVENOUS SEE ADMIN INSTRUCTIONS
Status: DISCONTINUED | OUTPATIENT
Start: 2019-10-11 | End: 2019-10-11 | Stop reason: HOSPADM

## 2019-10-11 RX ORDER — DIMENHYDRINATE 50 MG/ML
25 INJECTION, SOLUTION INTRAMUSCULAR; INTRAVENOUS
Status: DISCONTINUED | OUTPATIENT
Start: 2019-10-11 | End: 2019-10-11 | Stop reason: HOSPADM

## 2019-10-11 RX ORDER — PROPOFOL 10 MG/ML
INJECTION, EMULSION INTRAVENOUS PRN
Status: DISCONTINUED | OUTPATIENT
Start: 2019-10-11 | End: 2019-10-11

## 2019-10-11 RX ORDER — DEXAMETHASONE SODIUM PHOSPHATE 4 MG/ML
INJECTION, SOLUTION INTRA-ARTICULAR; INTRALESIONAL; INTRAMUSCULAR; INTRAVENOUS; SOFT TISSUE PRN
Status: DISCONTINUED | OUTPATIENT
Start: 2019-10-11 | End: 2019-10-11

## 2019-10-11 RX ORDER — LIDOCAINE HYDROCHLORIDE 10 MG/ML
INJECTION, SOLUTION INFILTRATION; PERINEURAL PRN
Status: DISCONTINUED | OUTPATIENT
Start: 2019-10-11 | End: 2019-10-11

## 2019-10-11 RX ORDER — OXYCODONE HYDROCHLORIDE 5 MG/1
5-10 TABLET ORAL EVERY 4 HOURS PRN
Qty: 12 TABLET | Refills: 0 | Status: SHIPPED | OUTPATIENT
Start: 2019-10-11 | End: 2019-10-24

## 2019-10-11 RX ORDER — ONDANSETRON 4 MG/1
4 TABLET, ORALLY DISINTEGRATING ORAL EVERY 30 MIN PRN
Status: DISCONTINUED | OUTPATIENT
Start: 2019-10-11 | End: 2019-10-11 | Stop reason: HOSPADM

## 2019-10-11 RX ORDER — GLYCOPYRROLATE 0.2 MG/ML
INJECTION, SOLUTION INTRAMUSCULAR; INTRAVENOUS PRN
Status: DISCONTINUED | OUTPATIENT
Start: 2019-10-11 | End: 2019-10-11

## 2019-10-11 RX ORDER — FENTANYL CITRATE 50 UG/ML
INJECTION, SOLUTION INTRAMUSCULAR; INTRAVENOUS PRN
Status: DISCONTINUED | OUTPATIENT
Start: 2019-10-11 | End: 2019-10-11

## 2019-10-11 RX ORDER — LABETALOL 20 MG/4 ML (5 MG/ML) INTRAVENOUS SYRINGE
10
Status: DISCONTINUED | OUTPATIENT
Start: 2019-10-11 | End: 2019-10-11 | Stop reason: HOSPADM

## 2019-10-11 RX ORDER — ALBUTEROL SULFATE 0.83 MG/ML
2.5 SOLUTION RESPIRATORY (INHALATION) EVERY 4 HOURS PRN
Status: DISCONTINUED | OUTPATIENT
Start: 2019-10-11 | End: 2019-10-11 | Stop reason: HOSPADM

## 2019-10-11 RX ADMIN — HYDROMORPHONE HYDROCHLORIDE 1 MG: 1 INJECTION, SOLUTION INTRAMUSCULAR; INTRAVENOUS; SUBCUTANEOUS at 10:49

## 2019-10-11 RX ADMIN — PROPOFOL 200 MG: 10 INJECTION, EMULSION INTRAVENOUS at 10:49

## 2019-10-11 RX ADMIN — FENTANYL CITRATE 50 MCG: 50 INJECTION, SOLUTION INTRAMUSCULAR; INTRAVENOUS at 11:45

## 2019-10-11 RX ADMIN — Medication 3 G: at 10:53

## 2019-10-11 RX ADMIN — OXYCODONE HYDROCHLORIDE 5 MG: 5 TABLET ORAL at 13:27

## 2019-10-11 RX ADMIN — LIDOCAINE HYDROCHLORIDE 5 ML: 10 INJECTION, SOLUTION INFILTRATION; PERINEURAL at 08:52

## 2019-10-11 RX ADMIN — PROPOFOL 75 MCG/KG/MIN: 10 INJECTION, EMULSION INTRAVENOUS at 10:49

## 2019-10-11 RX ADMIN — DEXAMETHASONE SODIUM PHOSPHATE 8 MG: 4 INJECTION, SOLUTION INTRA-ARTICULAR; INTRALESIONAL; INTRAMUSCULAR; INTRAVENOUS; SOFT TISSUE at 10:49

## 2019-10-11 RX ADMIN — KETOROLAC TROMETHAMINE 30 MG: 30 INJECTION, SOLUTION INTRAMUSCULAR at 10:49

## 2019-10-11 RX ADMIN — SODIUM CHLORIDE, POTASSIUM CHLORIDE, SODIUM LACTATE AND CALCIUM CHLORIDE: 600; 310; 30; 20 INJECTION, SOLUTION INTRAVENOUS at 12:37

## 2019-10-11 RX ADMIN — SODIUM CHLORIDE, POTASSIUM CHLORIDE, SODIUM LACTATE AND CALCIUM CHLORIDE: 600; 310; 30; 20 INJECTION, SOLUTION INTRAVENOUS at 09:30

## 2019-10-11 RX ADMIN — GLYCOPYRROLATE 0.2 MG: 0.2 INJECTION, SOLUTION INTRAMUSCULAR; INTRAVENOUS at 10:49

## 2019-10-11 RX ADMIN — TILMANOCEPT 0.53 MILLICURIE: KIT at 09:31

## 2019-10-11 RX ADMIN — LIDOCAINE HYDROCHLORIDE 50 MG: 10 INJECTION, SOLUTION INFILTRATION; PERINEURAL at 10:49

## 2019-10-11 RX ADMIN — MIDAZOLAM 2 MG: 1 INJECTION INTRAMUSCULAR; INTRAVENOUS at 10:45

## 2019-10-11 RX ADMIN — FENTANYL CITRATE 100 MCG: 50 INJECTION, SOLUTION INTRAMUSCULAR; INTRAVENOUS at 10:49

## 2019-10-11 RX ADMIN — ONDANSETRON HYDROCHLORIDE 4 MG: 2 INJECTION, SOLUTION INTRAVENOUS at 10:49

## 2019-10-11 ASSESSMENT — MIFFLIN-ST. JEOR
SCORE: 1925.89
SCORE: 1912.28

## 2019-10-11 NOTE — PROGRESS NOTES
Injected 525 uCi (microcuries) 99mTc-LYMPHOSEEK in the RIGHT Breast at the 10 o'clock position above the areola INTRADERMALLY for [facility] Node Biopsy. Injection completed @09:40am. VERA

## 2019-10-11 NOTE — TELEPHONE ENCOUNTER
Spoke with Dr Shah's nurse, she verified Dr Shah reviewed platelet  level and agrees to move forward with surgery.     Spoke with patient after seed localization procedure.  Patient voices feeling good, denies pain or feeling faint.  Education provided regarding home care after procedure and ROM exercises. Patient transported via Nursing support to pre op. Denies concerns or further questions.

## 2019-10-11 NOTE — PROGRESS NOTES
SBAR Seed Localization    SITUATION:  Patient to breast imaging center for imaging guided seed localizations before breast lumpectomy or excision biopsy with sentinel node injection.    BACKGROUND:  Breast imaging cancer, breast abnormality  Ordered procedure completed: Yes  Special needs identified: Yes     ASSESSMENT:  SBAR report called to patient care unit because of unexpected event in radiology: No  Allergies and medication list reviewed prior to procedure. Yes  Skin cleansed with ChloraPrep One-Step.  Anesthesia: approximately 5ml of 1% Lidocaine injection subcutaneous before seed insertion administered by the radiologist.   Gauze dressing over insertion site(s).  Post procedure mammogram completed: Yes    Patient tolerance: Patient tolerated procedure well.    RECOMMENDATIONS:  Patient transferred to Same Day Surgery in stable condition via wheelchair with Transport Services.    Please call [facility] [place] [phone] if there are any questions.

## 2019-10-11 NOTE — ANESTHESIA PREPROCEDURE EVALUATION
Anesthesia Pre-Procedure Evaluation    Patient: Alejandrina Mack   MRN: [id number] : 1970          Preoperative Diagnosis: Malignant neoplasm of right female breast, unspecified estrogen receptor status, unspecified site of breast (H) [C50.911]    Procedure(s):  RIGHT BREAST SEED LOCALIZED LUMPECTOMY WITH RIGHT SENTINEL NODE BIOPSY, POSSIBLE RIGHT AXILLARY NODE DISSECTION    Past Medical History:   Diagnosis Date     Bipolar 1 disorder, depressed (H)      Bipolar 2 disorder (H)      Seasonal affective disorder (H)      Sleep apnea     CPAP     History reviewed. No pertinent surgical history.  Anesthesia Evaluation     . Pt has had prior anesthetic. Type: General    No history of anesthetic complications          ROS/MED HX    ENT/Pulmonary:     (+)sleep apnea, uses CPAP , . .    Neurologic:  - neg neurologic ROS     Cardiovascular:  - neg cardiovascular ROS       METS/Exercise Tolerance:     Hematologic:  - neg hematologic  ROS       Musculoskeletal:  - neg musculoskeletal ROS       GI/Hepatic:  - neg GI/hepatic ROS       Renal/Genitourinary:  - ROS Renal section negative       Endo: Comment: Class 2    (+) Obesity, .      Psychiatric:     (+) psychiatric history depression, anxiety, other (comment) and bipolar (ADD)      Infectious Disease:         Malignancy:   (+) Malignancy History of Breast  Breast CA Active status post.         Other:    - neg other ROS                      Physical Exam  Normal systems: cardiovascular and dental    Airway   Mallampati: II  TM distance: >3 FB  Neck ROM: full    Dental     Cardiovascular   Rhythm and rate: regular and normal      Pulmonary    breath sounds clear to auscultation            Lab Results   Component Value Date    HCG Negative 10/11/2019       Preop Vitals  BP Readings from Last 3 Encounters:   10/11/19 132/63   19 122/84   19 118/74    Pulse Readings from Last 3 Encounters:   10/11/19 78   19 106   19 92      Resp Readings from Last 3  "Encounters:   10/11/19 18   09/30/19 16   01/11/19 16    SpO2 Readings from Last 3 Encounters:   10/11/19 98%   09/30/19 95%   01/11/19 98%      Temp Readings from Last 1 Encounters:   10/11/19 97.7  F (36.5  C) (Temporal)    Ht Readings from Last 1 Encounters:   10/11/19 1.778 m (5' 10\")      Wt Readings from Last 1 Encounters:   10/11/19 121.6 kg (268 lb)    Estimated body mass index is 38.45 kg/m  as calculated from the following:    Height as of this encounter: 1.778 m (5' 10\").    Weight as of this encounter: 121.6 kg (268 lb).       Anesthesia Plan      History & Physical Review  History and physical reviewed and following examination; no interval change.    ASA Status:  2 .    NPO Status:  > 8 hours    Plan for General and LMA with Intravenous induction. Maintenance will be Balanced (propofol infusion).    PONV prophylaxis:  Ondansetron (or other 5HT-3) and Dexamethasone or Solumedrol       Postoperative Care  Postoperative pain management:  IV analgesics, Oral pain medications, Multi-modal analgesia and Peripheral nerve block (Single Shot).      Consents  Anesthetic plan, risks, benefits and alternatives discussed with:  Patient.  Use of blood products discussed: No .   .                 Marlo Majano MD                    .  "

## 2019-10-11 NOTE — ANESTHESIA POSTPROCEDURE EVALUATION
Patient: Alejandrina Mack    Procedure(s):  RIGHT BREAST SEED LOCALIZED LUMPECTOMY WITH RIGHT SENTINEL NODE BIOPSY    Diagnosis:Malignant neoplasm of right female breast, unspecified estrogen receptor status, unspecified site of breast (H) [C50.911]  Diagnosis Additional Information: No value filed.    Anesthesia Type:  General, LMA    Note:  Anesthesia Post Evaluation    Patient location during evaluation: PACU  Patient participation: Able to fully participate in evaluation  Level of consciousness: awake  Pain management: adequate  multimodal analgesia used between 6 hours prior to anesthesia start to PACU dischargeAirway patency: patent  Cardiovascular status: acceptable  Respiratory status: acceptable  two or more mitigation strategies used for obstructive sleep apneaHydration status: acceptable  PONV: none             Last vitals:  Vitals:    10/11/19 0808 10/11/19 1240 10/11/19 1245   BP: 132/63 121/80 126/76   Pulse: 78 84    Resp: 18 13 14   Temp: 97.7  F (36.5  C) 97.4  F (36.3  C)    SpO2: 98% 97% 100%         Electronically Signed By: Marlo Majano MD  October 11, 2019  12:55 PM

## 2019-10-11 NOTE — OP NOTE
General Surgery Operative Note      Pre-operative diagnosis:  Right breast invasive lobular carcinoma   Post-operative diagnosis: Same    Procedure:  Right breast seed-localized lumpectomy, lower outer quadrant;  Right axillary sentinel lymph node biopsy     Surgeon: Giles Ambriz MD   Assistant(s): Cholo Gary PA-C  The Physician Assistant was medically necessary for their expertise in prepping, camera management, suctioning, suturing and retraction.   Anesthesia: General    Estimated blood loss:   25 cc     Specimens: ID Type Source Tests Collected by Time Destination   A : Right Axillary Sentinal Node #1 Tissue Lymph Node, [place] SURGICAL PATHOLOGY EXAM Giles Ambriz MD 10/11/2019 11:18 AM    B : Right Breast Lumpectomy With One Seed Tissue Breast, Right SURGICAL PATHOLOGY EXAM Giles Ambriz MD 10/11/2019 12:00 PM    C : Re-excision Deep Margins, Suture Marks New Margin Tissue Breast, Right SURGICAL PATHOLOGY EXAM Giles Ambriz MD 10/11/2019 12:23 PM    D : Re-excision Lateral Margin, Suture Marks New Margin Tissue Breast, Right SURGICAL PATHOLOGY EXAM Giles Ambriz MD 10/11/2019 12:25 PM    E : Re-excision Medial Margin, Suture Marks New Margin Tissue Breast, Right SURGICAL PATHOLOGY EXAM Giles Ambriz MD 10/11/2019 12:28 PM           INDICATION: Right breast invasive lobular carcinoma, upper outer quadrant.  MRI scanning revealed a larger area of non-masslike enhancement involving the majority of the right breast as well as some portions of the left breast.  She underwent core biopsy of this area on the left and was found to have benign tissue.  With this finding and the similarity between the non-masslike enhancement on the right and left, the hope is that the right sided imaging findings represent benign tissue as well.  We will therefore proceed with a seed localized lumpectomy without attempting to excise all of the non-masslike enhancement on the right.  She understands that if  substantial LCIS is noted on her lumpectomy specimen, she may require additional resection or mastectomy.  DESCRIPTION OF PROCEDURE: The patient was placed on the table in supine position. General anesthetic was induced and the right breast and axilla were prepped and draped in standard sterile fashion. A pause was performed.  We began with our sentinel lymph node biopsy. The patient had been injected with a radionuclear pharmaceutical preoperatively. After prep and drape, we injected  blue dye in the deep dermal tissue of the areola along the lateral aspect. We then used the Neoprobe to localize an area of increased activity in the axilla. We made an incision in the skin overlying that area of activity. The incision was carried down into the subcutaneous tissue and into the axillary space. We then localized an area of increased activity, and isolated a lymph node from surrounding tissues quite deep within the axilla. The lymph node had a count of 2100 and it was blue.  This was a large soft node.  This was passed off the field as left axillary sentinel lymph node #1. The remainder of the axilla was negative for blue dye, and there were no foci of increased radioactivity. There were no clinically positive nodes upon thorough evaluation by palpation of the axilla. We therefore irrigated the field with sterile saline.  Hemostasis was assured.  We then closed the incision with interrupted subcutaneous 3-0 Vicryl sutures, a running 4-0 Vicryl subcuticular suture and Steri-Strips.  The node(s) were sent for frozen section and found to be negative for metastatic disease.     We then turned our attention to the breast.  We used the seed placed in the [facility] as well as the post-seed mammograms to localize the area of interest. We made an incision centered at the area of highest counts. We carried the incision down into the breast tissue and excised the area of interest, including the seed.  We also excised a bit of  additional tissue with the lumpectomy specimen on the anteromedial aspect for sampling of the non-masslike enhancement noted on MRI.  This was marked with a short stitch superiorly and a long stitch laterally. A specimen mammogram was obtained and sent to [facility] which confirmed the presence of the area of interest and  the seed and the clip which had been placed at the time of her biopsy.  Hemostasis was maintained throughout with electrocautery. The field was irrigated with sterile saline.  Hemostasis was assured.  Gross evaluation with the pathologist revealed adequate margins however there was hematoma from the previous core biopsy that extended to the deep aspect of the specimen.  Pathology recommended re-resection of the deep lateral and medial aspects of the lumpectomy cavity.  These 3 margins were re-resected. Clips were placed at the 12, 3, 6, and 9 o'clock positions of the lumpectomy cavity.  Subcutaneous tissues were closed with 3-0 vicryl. The skin was closed with running 4-0 Vicryl subcuticular suture and Steri-Strips. The patient tolerated the procedure well.  Sponge and instrument counts were correct.     Giles Ambriz MD

## 2019-10-11 NOTE — ANESTHESIA CARE TRANSFER NOTE
Patient: Alejandrina Mack    Procedure(s):  RIGHT BREAST SEED LOCALIZED LUMPECTOMY WITH RIGHT SENTINEL NODE BIOPSY    Diagnosis: Malignant neoplasm of right female breast, unspecified estrogen receptor status, unspecified site of breast (H) [C50.911]  Diagnosis Additional Information: No value filed.    Anesthesia Type:   General, LMA     Note:  Airway :LMA  Patient transferred to:PACU  Comments: Patient with pontaneous respirations with adequate tidal volumes. Patient remains unresponsive to stimuli. To PACU with LMA in place on 8L O2 ventilating well. VSS. Report given.Handoff Report: Identifed the Patient, Identified the Reponsible Provider, Reviewed the pertinent medical history, Discussed the surgical course, Reviewed Intra-OP anesthesia mangement and issues during anesthesia, Set expectations for post-procedure period and Allowed opportunity for questions and acknowledgement of understanding      Vitals: (Last set prior to Anesthesia Care Transfer)    CRNA VITALS  10/11/2019 1209 - 10/11/2019 1245      10/11/2019             NIBP:  118/59    NIBP Mean:  81                Electronically Signed By: DANIELLA Smith CRNA  October 11, 2019  12:45 PM

## 2019-10-11 NOTE — DISCHARGE INSTRUCTIONS
HOME CARE FOLLOWING LUMPECTOMY  GAMA Joe, FAUZIA Woods R. O Donnell, J. Shaheen    APPOINTMENT WITH YOUR SURGEON:  All patients are to schedule a post-op appointment with their general surgeon.  Once you are home, call the office to schedule the appointment for 2-3 weeks from the date of your surgery.  You may need to be seen sooner if you have a drain in place.      Appointments to see your general surgeon at any of our locations can be made by calling:  #[phone]    You will receive a phone call from your surgeon with these results.  You will be able to ask questions and receive more in-depth explanation at your post-op appointment.  This appointment will also be when you discuss further evaluation, treatment, and referral to oncology and/or radiation oncology if this is necessary.  Occasionally special testing must be done on the surgical specimen which may delay the posting of your final pathology report.  You may call for your final pathology report after 1p.m. three working days after surgery to check on its status.    SUPPORT:  Wear a bra for support and comfort for 3-7 days, day and night.    DRAIN:  If you have a drain in place, you will need a separate appointment with a nurse in the office to have this removed.  You will have a form to keep track of the output of your drain.  When the output reaches a point where the total for a 24 hour period is less than 30cc s, you are ready to have the drain removed.  At that point you can call the office and talk to the nurse to arrange coming into the office to have this done.  If you have more than one drain in place, they may not all be removed at the same time, as the outputs can be very different from each.  The nurse will help you to manage this and help decide when the remaining drains will be taken out.    INCISIONAL CARE:    If you have a dressing in place, keep clean and dry for 48 hours; you may replace the gauze if it  becomes soiled.    After 48 hours you may remove the dressing and shower.  Do not submerse incision in water for 1 week.    Sutures will absorb and do not need to be removed.    If present, leave the steri-strips (white paper tapes) in place for 14 days after surgery.    You may expect a small amount of drainage from your incision.    A lump/ridge under the incision is normal and will gradually resolve.    BATHING:  You are allowed to bath (shallow water in a tub only) or shower 24-48 hours after your surgery.  Mild soap is OK to use near these sites.  When bathing, do not allow the incision or drain site to become submersed in water as this may increase the risk of infection.    ACTIVITY:  Cautiously resume exercise and strenuous activities such as jogging, tennis, aerobics, etc. Also, be careful of stretching activities with operative side for two weeks.    If you had a  sentinel node biopsy  at the time of your surgery:  You have no restrictions in addition to those noted above.    If you had an  axillary node dissection  at the time of your surgery:  You are recommended to restrict the activity of the arm on the side the dissection was done on.  This means:  no reaching overhead until cleared to do so by your surgeon, no carrying weight on that side greater than a couple pounds, no injections/vaccinations/ blood samples from that side, and no blood pressure measurements on that side.  It is also recommended to elevate the arm on pillows several times a day to decrease/minimize swelling, and avoid sleeping on that arm.    DIET:  No restrictions.  Increased fluid intake is recommended. While taking pain medications, increase dietary fiber or add a fiber supplementation like Metamucil or Citrucel to help prevent constipation - a possible side effect of pain medications.    DISCOMFORT:  Local anesthetic placed at surgery should provide relief for 4-8 hours.  Begin taking pain pills before discomfort is severe.  Take  the pain medication with some food, when possible, to minimize side effects.  Intermittent use of ice packs may help during the first 48 hours.  Expect gradual improvement.    RETURN APPOINTMENT:  Schedule a follow-up visit 2-3 weeks post-op.  Office Phone:  [phone]     CONTACT US IF THE FOLLOWING DEVELOPS:   1. A fever that is above 101     2. If there is a large amount of drainage, bleeding, or swelling.   3. Severe pain that is not relieved by your prescription.   4. Drainage that is thick, cloudy, yellow, green or white.   5. Any other questions not answered by  Frequently Asked Questions  sheet.      FREQUENTLY ASKED QUESTIONS:    Q:  How should my incision look?    A:  Normally your incision will appear slightly swollen with light redness directly along the incision itself as it heals.  It may feel like a bump or ridge as the healing/scarring happens, and over time (3-4 months) this bump or ridge feeling should slowly go away.  In general, clear or pink watery drainage can be normal at first as your incision heals, but should decrease over time.    Q:  How do I know if my incision is infected?  A:  Look at your incision for signs of infection, like redness around the incision spreading to surrounding skin, or drainage of cloudy or foul-smelling drainage.  If you feel warm, check your temperature to see if you are running a fever.    **If any of these things occur, please notify the nurse at our office.  We may need you to come into the office for an incision check.      Q:  How do I take care of my incision?  A:  If you have a dressing in place - Starting the day after surgery, replace the dressing 1-2 times a day until there is no further drainage from the incision.  At that time, a dressing is no longer needed.  Try to minimize tape on the skin if irritation is occurring at the tape sites.  If you have significant irritation from tape on the skin, please call the office to discuss other method of  dressing your incision.    Small pieces of tape called  steri-strips  may be present directly overlying your incision; these may be removed 10 days after surgery unless otherwise specified by your surgeon.  If these tapes start to loosen at the ends, you may trim them back until they fall off or are removed.      Q:  There is a piece of tape or a sticky  lead  still on my skin.  Can I remove this?  A:  Sometimes the sticky  leads  used for monitoring during surgery or for evaluation in the emergency department are not all removed while you are in the hospital.  These sometimes have a tab or metal dot on them.  You can easily remove these on your own, like taking off a band-aid.  If there is a gel substance under the  lead , simply wipe/clean it off with a washcloth or paper towel.      Q:  What can I do to minimize constipation (very hard stools, or lack of stools)?  A:  Stay well hydrated.  Increase your dietary fiber intake or take a fiber supplement -with plenty of water.  Walk around frequently.  You may consider an over-the-counter stool-softener.  Your Pharmacist can assist you with choosing one that is stocked at your pharmacy.  Constipation is also one of the most common side effects of pain medication.  If you are using pain medication, be pro-active and try to PREVENT problems with constipation by taking the steps above BEFORE constipation becomes a problem.    Q:  What do I do if I need more pain medications?  A:  Call the office to receive refills.  Be aware that certain pain meds cannot be called into a pharmacy and actually require a paper prescription.  A change may be made in your pain med as you progress thru your recovery period or if you have side effects to certain meds.    --Pain meds are NOT refilled after 5pm on weekdays, and NOT AT ALL on the weekends, so please look ahead to prevent problems.      Q:  Why am I having a hard time sleeping now that I am at home?  A:  Many medications you  receive while you are in the hospital can impact your sleep for a number of days after your surgery/hospitalization.  Decreased level of activity and naps during the day may also make sleeping at night difficult.  Try to minimize day-time naps, and get up frequently during the day to walk around your home during your recovery time.  Sleep aides may be of some help, but are not recommended for long-term use.      Q:  I am having some back discomfort.  What should I do?  A:  This may be related to certain positioning that was required for your surgery, extended periods of time in bed, or other changes in your overall activity level.  You may try ice, heat, acetaminophen, or ibuprofen to treat this temporarily.  Note that many pain medications have acetaminophen in them and would state this on the prescription bottle.  Be sure not to exceed the maximum of 4000mg per day of acetaminophen.     **If the pain you are having does not resolve, is severe, or is a flare of back pain you have had on other occasions prior to surgery, please contact your primary physician for further recommendations or for an appointment to be examined at their office.    Q:  Why am I having headaches?  A:  Headaches can be caused by many things:  caffeine withdrawal, use of pain meds, dehydration, high blood pressure, lack of sleep, over-activity/exhaustion, flare-up of usual migraine headaches.  If you feel this is related to muscle tension (a band-like feeling around the head, or a pressure at the low-back of the head) you may try ice or heat to this area.  You may need to drink more fluids (try electrolyte drink like Gatorade), rest, or take your usual migraine medications.   **If your headaches do not resolve, worsen, are accompanied by other symptoms, or if your blood pressure is high, please call your primary physician for recommendation and/or examination.    Q:  I am unable to urinate.  What do I do?  A:  A small percentage of people can  have difficulty urinating initially after surgery.  This includes being able to urinate only a very small amount at a time and feeling discomfort or pressure in the very low abdomen.  This is called  urinary retention , and is actually an urgent situation.  Proceed to your nearest Emergency department for evaluation (not an Urgent Care Center).  Sometimes the bladder does not work correctly after certain medications you receive during surgery, or related to certain procedures.  You may need to have a catheter placed until your bladder recovers.  When planning to go to an Emergency department, it may help to call the ER to let them know you are coming in for this problem after a surgery.  This may help you get in quicker to be evaluated.  **If you have symptoms of a urinary tract infection, please contact your primary physician for the proper evaluation and treatment.          If you have other questions, please call the office Monday thru Friday between 8am and 5pm to discuss with the nurse or physician assistant.  #[phone]    There is a surgeon ON CALL on weekday evenings and over the weekend in case of urgent need only, and may be contacted at the same number.    If you are having an emergency, call 911 or proceed to your nearest emergency department.    You received Toradol, an IV form of ibuprofen (Motrin) at 1049 am.  Do not take any ibuprofen products until 4:49 pm.     You had 1 OXYCODONE at 1:25pm.    GENERAL ANESTHESIA OR SEDATION ADULT DISCHARGE INSTRUCTIONS   SPECIAL PRECAUTIONS FOR 24 HOURS AFTER SURGERY    IT IS NOT UNUSUAL TO FEEL LIGHT-HEADED OR FAINT, UP TO 24 HOURS AFTER SURGERY OR WHILE TAKING PAIN MEDICATION.  IF YOU HAVE THESE SYMPTOMS; SIT FOR A FEW MINUTES BEFORE STANDING AND HAVE SOMEONE ASSIST YOU WHEN YOU GET UP TO WALK OR USE THE BATHROOM.    YOU SHOULD REST AND RELAX FOR THE NEXT 24 HOURS AND YOU MUST MAKE ARRANGEMENTS TO HAVE SOMEONE STAY WITH YOU FOR AT LEAST 24 HOURS AFTER YOUR  DISCHARGE.  AVOID HAZARDOUS AND STRENUOUS ACTIVITIES.  DO NOT MAKE IMPORTANT DECISIONS FOR 24 HOURS.    DO NOT DRIVE ANY VEHICLE OR OPERATE MECHANICAL EQUIPMENT FOR 24 HOURS FOLLOWING THE END OF YOUR SURGERY.  EVEN THOUGH YOU MAY FEEL NORMAL, YOUR REACTIONS MAY BE AFFECTED BY THE MEDICATION YOU HAVE RECEIVED.    DO NOT DRINK ALCOHOLIC BEVERAGES FOR 24 HOURS FOLLOWING YOUR SURGERY.    DRINK CLEAR LIQUIDS (APPLE JUICE, GINGER ALE, 7-UP, BROTH, ETC.).  PROGRESS TO YOUR REGULAR DIET AS YOU FEEL ABLE.    YOU MAY HAVE A DRY MOUTH, A SORE THROAT, MUSCLES ACHES OR TROUBLE SLEEPING.  THESE SHOULD GO AWAY AFTER 24 HOURS.    CALL YOUR DOCTOR FOR ANY OF THE FOLLOWING:  SIGNS OF INFECTION (FEVER, GROWING TENDERNESS AT THE SURGERY SITE, A LARGE AMOUNT OF DRAINAGE OR BLEEDING, SEVERE PAIN, FOUL-SMELLING DRAINAGE, REDNESS OR SWELLING.    IT HAS BEEN OVER 8 TO 10 HOURS SINCE SURGERY AND YOU ARE STILL NOT ABLE TO URINATE (PASS WATER).

## 2019-10-15 ENCOUNTER — TRANSFERRED RECORDS (OUTPATIENT)
Dept: SURGERY | Facility: CLINIC | Age: 49
End: 2019-10-15

## 2019-10-15 LAB — COPATH REPORT: NORMAL

## 2019-10-21 NOTE — RESULT ENCOUNTER NOTE
Patient was notified of these results. Discussed with oncology, will be presented at breast conference.   Giles Ambriz MD  10/21/2019 1:05 PM

## 2019-10-22 ENCOUNTER — TRANSFERRED RECORDS (OUTPATIENT)
Dept: HEALTH INFORMATION MANAGEMENT | Facility: CLINIC | Age: 49
End: 2019-10-22

## 2019-10-24 ENCOUNTER — OFFICE VISIT (OUTPATIENT)
Dept: SURGERY | Facility: CLINIC | Age: 49
End: 2019-10-24
Payer: COMMERCIAL

## 2019-10-24 VITALS
DIASTOLIC BLOOD PRESSURE: 96 MMHG | HEIGHT: 70 IN | HEART RATE: 106 BPM | RESPIRATION RATE: 16 BRPM | OXYGEN SATURATION: 95 % | WEIGHT: 268 LBS | BODY MASS INDEX: 38.37 KG/M2 | SYSTOLIC BLOOD PRESSURE: 134 MMHG

## 2019-10-24 DIAGNOSIS — Z09 SURGICAL FOLLOWUP VISIT: Primary | ICD-10-CM

## 2019-10-24 PROCEDURE — 99024 POSTOP FOLLOW-UP VISIT: CPT | Performed by: SURGERY

## 2019-10-24 ASSESSMENT — MIFFLIN-ST. JEOR: SCORE: 1925.89

## 2019-10-24 NOTE — LETTER
2019       Re: Alejandrina Mack - 1970    She returns in follow-up after right breast seed localized lumpectomy and sentinel node biopsy. Pathology shows negative sentinel node.  She has invasive cancer with negative margins.  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     Exam: Incisions are healing nicely, there is no sign of infection or hematoma.  There may be a small amount of seroma at the lumpectomy site.     Impression: Excellent postop recovery, close margins with LCIS     Plan: She is scheduled to be presented tomorrow at the multidisciplinary breast conference to discuss her margins and whether additional surgery would be required.  We will contact her after the meeting with the recommendation.     Giles Ambriz MD

## 2019-10-24 NOTE — PROGRESS NOTES
She returns in follow-up after right breast seed localized lumpectomy and sentinel node biopsy.  Pathology shows negative sentinel node.  She has invasive cancer with negative margins.  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    Exam: Incisions are healing nicely, there is no sign of infection or hematoma.  There may be a small amount of seroma at the lumpectomy site.    Impression: Excellent postop recovery, close margins with LCIS    Plan: She is scheduled to be presented tomorrow at the multidisciplinary breast conference to discuss her margins and whether additional surgery would be required.  We will contact her after the meeting with the recommendation.    Giles Ambriz MD  10/24/2019 3:52 PM    Please route or send letter to:  Primary Care Provider (PCP) and Dr. Keke Shah and Include Progress Note

## 2019-11-04 ENCOUNTER — HEALTH MAINTENANCE LETTER (OUTPATIENT)
Age: 49
End: 2019-11-04

## 2019-11-12 ENCOUNTER — HOSPITAL ENCOUNTER (OUTPATIENT)
Facility: CLINIC | Age: 49
End: 2019-11-12
Attending: SURGERY | Admitting: SURGERY
Payer: COMMERCIAL

## 2019-11-12 ENCOUNTER — TELEPHONE (OUTPATIENT)
Dept: SURGERY | Facility: CLINIC | Age: 49
End: 2019-11-12

## 2019-11-12 ENCOUNTER — PREP FOR PROCEDURE (OUTPATIENT)
Dept: SURGERY | Facility: CLINIC | Age: 49
End: 2019-11-12

## 2019-11-12 DIAGNOSIS — C50.911 MALIGNANT NEOPLASM OF RIGHT FEMALE BREAST, UNSPECIFIED ESTROGEN RECEPTOR STATUS, UNSPECIFIED SITE OF BREAST (H): Primary | ICD-10-CM

## 2019-11-12 NOTE — TELEPHONE ENCOUNTER
Type of surgery: RE-EXCISION INFERIOR AND MEDIAL MARGINS OF RIGHT BREAST LUMPECTOMY SITE   Location of surgery: [facility]  Date and time of surgery: 1/3/20 @ 7:30 am   Surgeon: DR. CABEZAS  Pre-Op Appt Date: PATIENT TO SCHEDULE   Post-Op Appt Date: PATIENT TO SCHEDULE   Packet sent out: Yes  Pre-cert/Authorization completed:  Not Applicable  Date: 11-12-19 updated 12/27/2019     RE-EXCISION INFERIOR AND MEDIAL MARGINS OF RIGHT BREAST LUMPECTOMY SITE   GENERAL   PT INST TO HAVE H&P AT [facility]   60 MINS REQ  PA ASSIST MOY   ALW

## 2019-11-12 NOTE — LETTER
[facility]    [address], [address]  [place], [place] [address]  Phone [phone]  Fax [phone] [address], [address]  [place] [address]  Phone [phone]  Fax [phone]    [email or web address]   November 12, 2019      Alejandrina Mack  [address] [address]      We realize with scheduling surgery, one of your first questions is, how much will this cost?  Below we have provided you with the information you will need to receive an estimate for your surgery.    You are scheduled for the following procedure:  Re-excision inferior and medial margins of right breast lumpectomy site      Surgeon:  Dr. Ambriz     Physician Assistant:  Yes      Please make sure to have your insurance card available at the time of calling.    Surgeon & Physician Assistant charges and facility charges for [facility], [facility] or [facility]:    Consumer Price Line at [phone]   -  It is important to note that there may be a Physician Assistant assisting with your surgery.  Please be sure to mention this when calling for the estimate.      Facility Charges at [facility], [facility] or [facility]:  [facility] at [phone]  [facility] at [phone]  [facility] at [phone] or [phone]    Anesthesiologist Charges:  [facility] at [phone]    [facility] - Nurse Anesthetist Charges:  [facility] at [phone]

## 2019-12-12 RX ORDER — CEFAZOLIN SODIUM 1 G/3ML
1 INJECTION, POWDER, FOR SOLUTION INTRAMUSCULAR; INTRAVENOUS SEE ADMIN INSTRUCTIONS
Status: CANCELLED | OUTPATIENT
Start: 2019-12-12

## 2019-12-12 RX ORDER — CEFAZOLIN SODIUM IN 0.9 % NACL 3 G/100 ML
3 INTRAVENOUS SOLUTION, PIGGYBACK (ML) INTRAVENOUS
Status: CANCELLED | OUTPATIENT
Start: 2019-12-12

## 2019-12-13 ENCOUNTER — TRANSFERRED RECORDS (OUTPATIENT)
Dept: SURGERY | Facility: CLINIC | Age: 49
End: 2019-12-13

## 2019-12-23 ENCOUNTER — TRANSFERRED RECORDS (OUTPATIENT)
Dept: SURGERY | Facility: CLINIC | Age: 49
End: 2019-12-23

## 2019-12-30 ENCOUNTER — TELEPHONE (OUTPATIENT)
Dept: SURGERY | Facility: CLINIC | Age: 49
End: 2019-12-30

## 2019-12-30 RX ORDER — VENLAFAXINE HYDROCHLORIDE 150 MG/1
150 TABLET, EXTENDED RELEASE ORAL DAILY
COMMUNITY

## 2019-12-30 NOTE — TELEPHONE ENCOUNTER
Type of surgery: REEXCISION INFERIOR AND MEDIAL MARGINS OF RIGHT BREAST LUMPOECTOMY SITE     Location of surgery: [facility]  Date and time of surgery: 1/3/20 @ 7:30 AM   Surgeon: ANURAG CABEZAS MD   Pre-Op Appt Date: DONE 12/13/2019  Post-Op Appt Date: PATIENT TO SCHEDULE     Packet sent out: Yes  Pre-cert/Authorization completed:  Not Applicable  Date: 12/30/2019      REEXCISION INFERIOR AND MEDIAL MARGINS OF RIGHT BREAST LUMPOECTOMY SITE    GENERAL H&P DONE 12/13/2019 60 MIN REQ PA ASSIST DJM NMS

## 2020-01-02 ASSESSMENT — MIFFLIN-ST. JEOR: SCORE: 1948.57

## 2020-01-03 ENCOUNTER — ANESTHESIA EVENT (OUTPATIENT)
Dept: SURGERY | Facility: CLINIC | Age: 50
End: 2020-01-03
Payer: COMMERCIAL

## 2020-01-03 ENCOUNTER — APPOINTMENT (OUTPATIENT)
Dept: SURGERY | Facility: PHYSICIAN GROUP | Age: 50
End: 2020-01-03
Payer: COMMERCIAL

## 2020-01-03 ENCOUNTER — ANESTHESIA (OUTPATIENT)
Dept: SURGERY | Facility: CLINIC | Age: 50
End: 2020-01-03
Payer: COMMERCIAL

## 2020-01-03 ENCOUNTER — HOSPITAL ENCOUNTER (OUTPATIENT)
Facility: CLINIC | Age: 50
Discharge: HOME | End: 2020-01-03
Attending: SURGERY | Admitting: SURGERY
Payer: COMMERCIAL

## 2020-01-03 VITALS
HEART RATE: 78 BPM | RESPIRATION RATE: 20 BRPM | HEIGHT: 70 IN | BODY MASS INDEX: 38.65 KG/M2 | WEIGHT: 270 LBS | DIASTOLIC BLOOD PRESSURE: 89 MMHG | SYSTOLIC BLOOD PRESSURE: 144 MMHG | TEMPERATURE: 98.8 F | OXYGEN SATURATION: 98 %

## 2020-01-03 LAB
HCG UR QL: NEGATIVE
PLATELET # BLD AUTO: 83 10E9/L (ref 150–450)

## 2020-01-03 PROCEDURE — 37000009 ZZH ANESTHESIA TECHNICAL FEE, EACH ADDTL 15 MIN: Performed by: SURGERY

## 2020-01-03 PROCEDURE — 85049 AUTOMATED PLATELET COUNT: CPT | Performed by: SURGERY

## 2020-01-03 PROCEDURE — 27210794 ZZH OR GENERAL SUPPLY STERILE: Performed by: SURGERY

## 2020-01-03 PROCEDURE — 25000125 ZZHC RX 250: Performed by: NURSE ANESTHETIST, CERTIFIED REGISTERED

## 2020-01-03 PROCEDURE — 81025 URINE PREGNANCY TEST: CPT | Performed by: ANESTHESIOLOGY

## 2020-01-03 PROCEDURE — 36000060 ZZH SURGERY LEVEL 3 W FLUORO 1ST 30 MIN: Performed by: SURGERY

## 2020-01-03 PROCEDURE — 25800030 ZZH RX IP 258 OP 636: Performed by: ANESTHESIOLOGY

## 2020-01-03 PROCEDURE — 37000008 ZZH ANESTHESIA TECHNICAL FEE, 1ST 30 MIN: Performed by: SURGERY

## 2020-01-03 PROCEDURE — 88307 TISSUE EXAM BY PATHOLOGIST: CPT | Performed by: SURGERY

## 2020-01-03 PROCEDURE — 25000125 ZZHC RX 250: Performed by: SURGERY

## 2020-01-03 PROCEDURE — 71000012 ZZH RECOVERY PHASE 1 LEVEL 1 FIRST HR: Performed by: SURGERY

## 2020-01-03 PROCEDURE — 88305 TISSUE EXAM BY PATHOLOGIST: CPT | Mod: 26 | Performed by: SURGERY

## 2020-01-03 PROCEDURE — 25000128 H RX IP 250 OP 636: Performed by: SURGERY

## 2020-01-03 PROCEDURE — 25000128 H RX IP 250 OP 636: Performed by: NURSE ANESTHETIST, CERTIFIED REGISTERED

## 2020-01-03 PROCEDURE — 36415 COLL VENOUS BLD VENIPUNCTURE: CPT | Performed by: SURGERY

## 2020-01-03 PROCEDURE — 40000306 ZZH STATISTIC PRE PROC ASSESS II: Performed by: SURGERY

## 2020-01-03 PROCEDURE — 25000132 ZZH RX MED GY IP 250 OP 250 PS 637: Performed by: ANESTHESIOLOGY

## 2020-01-03 PROCEDURE — 25000125 ZZHC RX 250: Performed by: ANESTHESIOLOGY

## 2020-01-03 PROCEDURE — 71000027 ZZH RECOVERY PHASE 2 EACH 15 MINS: Performed by: SURGERY

## 2020-01-03 PROCEDURE — 36000058 ZZH SURGERY LEVEL 3 EA 15 ADDTL MIN: Performed by: SURGERY

## 2020-01-03 PROCEDURE — 88307 TISSUE EXAM BY PATHOLOGIST: CPT | Mod: 26 | Performed by: SURGERY

## 2020-01-03 PROCEDURE — 19301 PARTIAL MASTECTOMY: CPT | Mod: RT | Performed by: SURGERY

## 2020-01-03 PROCEDURE — 88305 TISSUE EXAM BY PATHOLOGIST: CPT | Performed by: SURGERY

## 2020-01-03 RX ORDER — SODIUM CHLORIDE, SODIUM LACTATE, POTASSIUM CHLORIDE, CALCIUM CHLORIDE 600; 310; 30; 20 MG/100ML; MG/100ML; MG/100ML; MG/100ML
INJECTION, SOLUTION INTRAVENOUS CONTINUOUS
Status: DISCONTINUED | OUTPATIENT
Start: 2020-01-03 | End: 2020-01-03 | Stop reason: HOSPADM

## 2020-01-03 RX ORDER — CEFAZOLIN SODIUM 1 G/3ML
1 INJECTION, POWDER, FOR SOLUTION INTRAMUSCULAR; INTRAVENOUS SEE ADMIN INSTRUCTIONS
Status: DISCONTINUED | OUTPATIENT
Start: 2020-01-03 | End: 2020-01-03 | Stop reason: HOSPADM

## 2020-01-03 RX ORDER — NALOXONE HYDROCHLORIDE 0.4 MG/ML
.1-.4 INJECTION, SOLUTION INTRAMUSCULAR; INTRAVENOUS; SUBCUTANEOUS
Status: DISCONTINUED | OUTPATIENT
Start: 2020-01-03 | End: 2020-01-03 | Stop reason: HOSPADM

## 2020-01-03 RX ORDER — FENTANYL CITRATE 50 UG/ML
INJECTION, SOLUTION INTRAMUSCULAR; INTRAVENOUS PRN
Status: DISCONTINUED | OUTPATIENT
Start: 2020-01-03 | End: 2020-01-03

## 2020-01-03 RX ORDER — CEFAZOLIN SODIUM IN 0.9 % NACL 3 G/100 ML
3 INTRAVENOUS SOLUTION, PIGGYBACK (ML) INTRAVENOUS
Status: DISCONTINUED | OUTPATIENT
Start: 2020-01-03 | End: 2020-01-03

## 2020-01-03 RX ORDER — ONDANSETRON 2 MG/ML
4 INJECTION INTRAMUSCULAR; INTRAVENOUS EVERY 30 MIN PRN
Status: DISCONTINUED | OUTPATIENT
Start: 2020-01-03 | End: 2020-01-03 | Stop reason: HOSPADM

## 2020-01-03 RX ORDER — FENTANYL CITRATE 50 UG/ML
25-50 INJECTION, SOLUTION INTRAMUSCULAR; INTRAVENOUS
Status: DISCONTINUED | OUTPATIENT
Start: 2020-01-03 | End: 2020-01-03 | Stop reason: HOSPADM

## 2020-01-03 RX ORDER — CEFAZOLIN SODIUM 1 G/3ML
1 INJECTION, POWDER, FOR SOLUTION INTRAMUSCULAR; INTRAVENOUS SEE ADMIN INSTRUCTIONS
Status: DISCONTINUED | OUTPATIENT
Start: 2020-01-03 | End: 2020-01-03

## 2020-01-03 RX ORDER — PROPOFOL 10 MG/ML
INJECTION, EMULSION INTRAVENOUS CONTINUOUS PRN
Status: DISCONTINUED | OUTPATIENT
Start: 2020-01-03 | End: 2020-01-03

## 2020-01-03 RX ORDER — LIDOCAINE 40 MG/G
CREAM TOPICAL
Status: DISCONTINUED | OUTPATIENT
Start: 2020-01-03 | End: 2020-01-03 | Stop reason: HOSPADM

## 2020-01-03 RX ORDER — OXYCODONE HYDROCHLORIDE 5 MG/1
5 TABLET ORAL
Status: DISCONTINUED | OUTPATIENT
Start: 2020-01-03 | End: 2020-01-03 | Stop reason: HOSPADM

## 2020-01-03 RX ORDER — DEXAMETHASONE SODIUM PHOSPHATE 4 MG/ML
INJECTION, SOLUTION INTRA-ARTICULAR; INTRALESIONAL; INTRAMUSCULAR; INTRAVENOUS; SOFT TISSUE PRN
Status: DISCONTINUED | OUTPATIENT
Start: 2020-01-03 | End: 2020-01-03

## 2020-01-03 RX ORDER — ONDANSETRON 4 MG/1
4 TABLET, ORALLY DISINTEGRATING ORAL EVERY 30 MIN PRN
Status: DISCONTINUED | OUTPATIENT
Start: 2020-01-03 | End: 2020-01-03 | Stop reason: HOSPADM

## 2020-01-03 RX ORDER — MEPERIDINE HYDROCHLORIDE 25 MG/ML
12.5 INJECTION INTRAMUSCULAR; INTRAVENOUS; SUBCUTANEOUS
Status: DISCONTINUED | OUTPATIENT
Start: 2020-01-03 | End: 2020-01-03 | Stop reason: HOSPADM

## 2020-01-03 RX ORDER — CEFAZOLIN SODIUM IN 0.9 % NACL 3 G/100 ML
3 INTRAVENOUS SOLUTION, PIGGYBACK (ML) INTRAVENOUS
Status: COMPLETED | OUTPATIENT
Start: 2020-01-03 | End: 2020-01-03

## 2020-01-03 RX ORDER — BUPIVACAINE HYDROCHLORIDE AND EPINEPHRINE 2.5; 5 MG/ML; UG/ML
INJECTION, SOLUTION EPIDURAL; INFILTRATION; INTRACAUDAL; PERINEURAL PRN
Status: DISCONTINUED | OUTPATIENT
Start: 2020-01-03 | End: 2020-01-03 | Stop reason: HOSPADM

## 2020-01-03 RX ORDER — PROPOFOL 10 MG/ML
INJECTION, EMULSION INTRAVENOUS PRN
Status: DISCONTINUED | OUTPATIENT
Start: 2020-01-03 | End: 2020-01-03

## 2020-01-03 RX ORDER — GLYCOPYRROLATE 0.2 MG/ML
INJECTION, SOLUTION INTRAMUSCULAR; INTRAVENOUS PRN
Status: DISCONTINUED | OUTPATIENT
Start: 2020-01-03 | End: 2020-01-03

## 2020-01-03 RX ORDER — IBUPROFEN 600 MG/1
600 TABLET, FILM COATED ORAL ONCE
Status: COMPLETED | OUTPATIENT
Start: 2020-01-03 | End: 2020-01-03

## 2020-01-03 RX ORDER — ONDANSETRON 2 MG/ML
INJECTION INTRAMUSCULAR; INTRAVENOUS PRN
Status: DISCONTINUED | OUTPATIENT
Start: 2020-01-03 | End: 2020-01-03

## 2020-01-03 RX ADMIN — MIDAZOLAM 2 MG: 1 INJECTION INTRAMUSCULAR; INTRAVENOUS at 07:38

## 2020-01-03 RX ADMIN — FENTANYL CITRATE 100 MCG: 50 INJECTION, SOLUTION INTRAMUSCULAR; INTRAVENOUS at 07:43

## 2020-01-03 RX ADMIN — Medication 3 G: at 07:48

## 2020-01-03 RX ADMIN — DEXAMETHASONE SODIUM PHOSPHATE 4 MG: 4 INJECTION, SOLUTION INTRA-ARTICULAR; INTRALESIONAL; INTRAMUSCULAR; INTRAVENOUS; SOFT TISSUE at 07:43

## 2020-01-03 RX ADMIN — SODIUM CHLORIDE, POTASSIUM CHLORIDE, SODIUM LACTATE AND CALCIUM CHLORIDE: 600; 310; 30; 20 INJECTION, SOLUTION INTRAVENOUS at 07:00

## 2020-01-03 RX ADMIN — PROPOFOL 200 MG: 10 INJECTION, EMULSION INTRAVENOUS at 07:43

## 2020-01-03 RX ADMIN — LIDOCAINE HYDROCHLORIDE 50 MG: 10 INJECTION, SOLUTION EPIDURAL; INFILTRATION; INTRACAUDAL; PERINEURAL at 07:43

## 2020-01-03 RX ADMIN — PROPOFOL 75 MCG/KG/MIN: 10 INJECTION, EMULSION INTRAVENOUS at 07:46

## 2020-01-03 RX ADMIN — IBUPROFEN 600 MG: 600 TABLET ORAL at 09:13

## 2020-01-03 RX ADMIN — GLYCOPYRROLATE 0.2 MG: 0.2 INJECTION, SOLUTION INTRAMUSCULAR; INTRAVENOUS at 07:43

## 2020-01-03 RX ADMIN — ONDANSETRON HYDROCHLORIDE 4 MG: 2 INJECTION, SOLUTION INTRAVENOUS at 08:09

## 2020-01-03 ASSESSMENT — MIFFLIN-ST. JEOR: SCORE: 1934.96

## 2020-01-03 ASSESSMENT — ENCOUNTER SYMPTOMS: DYSRHYTHMIAS: 0

## 2020-01-03 NOTE — ANESTHESIA POSTPROCEDURE EVALUATION
Patient: Alejandrina Mack    Procedure(s):  RE-EXCISION INFERIOR AND MEDIAL MARGINS OF RIGHT BREAST LUMPECTOMY SITE    Diagnosis:Malignant neoplasm of right female breast, unspecified estrogen receptor status, unspecified site of breast (H) [C50.911]  Diagnosis Additional Information: No value filed.    Anesthesia Type:  General, LMA    Note:  Anesthesia Post Evaluation    Patient location during evaluation: PACU  Patient participation: Able to fully participate in evaluation  Level of consciousness: awake  Pain management: adequate  Airway patency: patent  Cardiovascular status: acceptable  Respiratory status: acceptable  Hydration status: euvolemic  PONV: controlled     Anesthetic complications: None          Last vitals:  Vitals:    01/03/20 0915 01/03/20 0940 01/03/20 1000   BP:  (!) 151/87 (!) 144/89   Pulse: 100 78    Resp: 13 20    Temp:  97.9  F (36.6  C) 98.8  F (37.1  C)   SpO2: 97% 99% 98%         Electronically Signed By: Carlo Christianson MD  January 3, 2020  1:26 PM

## 2020-01-03 NOTE — DISCHARGE INSTRUCTIONS
GENERAL ANESTHESIA OR SEDATION ADULT DISCHARGE INSTRUCTIONS   SPECIAL PRECAUTIONS FOR 24 HOURS AFTER SURGERY    IT IS NOT UNUSUAL TO FEEL LIGHT-HEADED OR FAINT, UP TO 24 HOURS AFTER SURGERY OR WHILE TAKING PAIN MEDICATION.  IF YOU HAVE THESE SYMPTOMS; SIT FOR A FEW MINUTES BEFORE STANDING AND HAVE SOMEONE ASSIST YOU WHEN YOU GET UP TO WALK OR USE THE BATHROOM.    YOU SHOULD REST AND RELAX FOR THE NEXT 24 HOURS AND YOU MUST MAKE ARRANGEMENTS TO HAVE SOMEONE STAY WITH YOU FOR AT LEAST 24 HOURS AFTER YOUR DISCHARGE.  AVOID HAZARDOUS AND STRENUOUS ACTIVITIES.  DO NOT MAKE IMPORTANT DECISIONS FOR 24 HOURS.    DO NOT DRIVE ANY VEHICLE OR OPERATE MECHANICAL EQUIPMENT FOR 24 HOURS FOLLOWING THE END OF YOUR SURGERY.  EVEN THOUGH YOU MAY FEEL NORMAL, YOUR REACTIONS MAY BE AFFECTED BY THE MEDICATION YOU HAVE RECEIVED.    DO NOT DRINK ALCOHOLIC BEVERAGES FOR 24 HOURS FOLLOWING YOUR SURGERY.    DRINK CLEAR LIQUIDS (APPLE JUICE, GINGER ALE, 7-UP, BROTH, ETC.).  PROGRESS TO YOUR REGULAR DIET AS YOU FEEL ABLE.    YOU MAY HAVE A DRY MOUTH, A SORE THROAT, MUSCLES ACHES OR TROUBLE SLEEPING.  THESE SHOULD GO AWAY AFTER 24 HOURS.    CALL YOUR DOCTOR FOR ANY OF THE FOLLOWING:  SIGNS OF INFECTION (FEVER, GROWING TENDERNESS AT THE SURGERY SITE, A LARGE AMOUNT OF DRAINAGE OR BLEEDING, SEVERE PAIN, FOUL-SMELLING DRAINAGE, REDNESS OR SWELLING.    IT HAS BEEN OVER 8 TO 10 HOURS SINCE SURGERY AND YOU ARE STILL NOT ABLE TO URINATE (PASS WATER).           HOME CARE FOLLOWING LUMPECTOMY  GAMA Joe, FAUZIA Woods, TONIO Arana, JAH Bowen    APPOINTMENT WITH YOUR SURGEON:  All patients are to schedule a post-op appointment with their general surgeon.  Once you are home, call the office to schedule the appointment for 2-3 weeks from the date of your surgery.  You may need to be seen sooner if you have a drain in place.      Appointments to see your general surgeon at any of our locations can be made by calling:   #[phone]    You will receive a phone call from your surgeon with these results.  You will be able to ask questions and receive more in-depth explanation at your post-op appointment.  This appointment will also be when you discuss further evaluation, treatment, and referral to oncology and/or radiation oncology if this is necessary.  Occasionally special testing must be done on the surgical specimen which may delay the posting of your final pathology report.  You may call for your final pathology report after 1p.m. three working days after surgery to check on its status.    SUPPORT:  Wear a bra for support and comfort for 3-7 days, day and night.        INCISIONAL CARE:    If you have a dressing in place, keep clean and dry for 48 hours; you may replace the gauze if it becomes soiled.    After 48 hours you may remove the dressing and shower.  Do not submerse incision in water for 1 week.    Sutures will absorb and do not need to be removed.    If present, leave the steri-strips (white paper tapes) in place for 14 days after surgery.    You may expect a small amount of drainage from your incision.    A lump/ridge under the incision is normal and will gradually resolve.    BATHING:  You are allowed to bath (shallow water in a tub only) or shower 24-48 hours after your surgery.  Mild soap is OK to use near these sites.  When bathing, do not allow the incision or drain site to become submersed in water as this may increase the risk of infection.    ACTIVITY:  Cautiously resume exercise and strenuous activities such as jogging, tennis, aerobics, etc. Also, be careful of stretching activities with operative side for two weeks.      DIET:  No restrictions.  Increased fluid intake is recommended. While taking pain medications, increase dietary fiber or add a fiber supplementation like Metamucil or Citrucel to help prevent constipation - a possible side effect of pain medications.    DISCOMFORT:  Local anesthetic placed  at surgery should provide relief for 4-8 hours.  Begin taking pain pills before discomfort is severe.  Take the pain medication with some food, when possible, to minimize side effects.  Intermittent use of ice packs may help during the first 48 hours.  Expect gradual improvement.    RETURN APPOINTMENT:  Schedule a follow-up visit 2-3 weeks post-op.  Office Phone:  [phone]     CONTACT US IF THE FOLLOWING DEVELOPS:   1. A fever that is above 101     2. If there is a large amount of drainage, bleeding, or swelling.   3. Severe pain that is not relieved by your prescription.   4. Drainage that is thick, cloudy, yellow, green or white.   5. Any other questions not answered by  Frequently Asked Questions  sheet.      FREQUENTLY ASKED QUESTIONS:    Q:  How should my incision look?    A:  Normally your incision will appear slightly swollen with light redness directly along the incision itself as it heals.  It may feel like a bump or ridge as the healing/scarring happens, and over time (3-4 months) this bump or ridge feeling should slowly go away.  In general, clear or pink watery drainage can be normal at first as your incision heals, but should decrease over time.    Q:  How do I know if my incision is infected?  A:  Look at your incision for signs of infection, like redness around the incision spreading to surrounding skin, or drainage of cloudy or foul-smelling drainage.  If you feel warm, check your temperature to see if you are running a fever.    **If any of these things occur, please notify the nurse at our office.  We may need you to come into the office for an incision check.      Q:  How do I take care of my incision?  A:  If you have a dressing in place - Starting the day after surgery, replace the dressing 1-2 times a day until there is no further drainage from the incision.  At that time, a dressing is no longer needed.  Try to minimize tape on the skin if irritation is occurring at the tape sites.  If you  have significant irritation from tape on the skin, please call the office to discuss other method of dressing your incision.    Small pieces of tape called  steri-strips  may be present directly overlying your incision; these may be removed 10 days after surgery unless otherwise specified by your surgeon.  If these tapes start to loosen at the ends, you may trim them back until they fall off or are removed.      Q:  There is a piece of tape or a sticky  lead  still on my skin.  Can I remove this?  A:  Sometimes the sticky  leads  used for monitoring during surgery or for evaluation in the emergency department are not all removed while you are in the hospital.  These sometimes have a tab or metal dot on them.  You can easily remove these on your own, like taking off a band-aid.  If there is a gel substance under the  lead , simply wipe/clean it off with a washcloth or paper towel.      Q:  What can I do to minimize constipation (very hard stools, or lack of stools)?  A:  Stay well hydrated.  Increase your dietary fiber intake or take a fiber supplement -with plenty of water.  Walk around frequently.  You may consider an over-the-counter stool-softener.  Your Pharmacist can assist you with choosing one that is stocked at your pharmacy.  Constipation is also one of the most common side effects of pain medication.  If you are using pain medication, be pro-active and try to PREVENT problems with constipation by taking the steps above BEFORE constipation becomes a problem.    Q:  What do I do if I need more pain medications?  A:  Call the office to receive refills.  Be aware that certain pain meds cannot be called into a pharmacy and actually require a paper prescription.  A change may be made in your pain med as you progress thru your recovery period or if you have side effects to certain meds.    --Pain meds are NOT refilled after 5pm on weekdays, and NOT AT ALL on the weekends, so please look ahead to prevent  problems.      Q:  Why am I having a hard time sleeping now that I am at home?  A:  Many medications you receive while you are in the hospital can impact your sleep for a number of days after your surgery/hospitalization.  Decreased level of activity and naps during the day may also make sleeping at night difficult.  Try to minimize day-time naps, and get up frequently during the day to walk around your home during your recovery time.  Sleep aides may be of some help, but are not recommended for long-term use.      Q:  I am having some back discomfort.  What should I do?  A:  This may be related to certain positioning that was required for your surgery, extended periods of time in bed, or other changes in your overall activity level.  You may try ice, heat, acetaminophen, or ibuprofen to treat this temporarily.  Note that many pain medications have acetaminophen in them and would state this on the prescription bottle.  Be sure not to exceed the maximum of 4000mg per day of acetaminophen.     **If the pain you are having does not resolve, is severe, or is a flare of back pain you have had on other occasions prior to surgery, please contact your primary physician for further recommendations or for an appointment to be examined at their office.    Q:  Why am I having headaches?  A:  Headaches can be caused by many things:  caffeine withdrawal, use of pain meds, dehydration, high blood pressure, lack of sleep, over-activity/exhaustion, flare-up of usual migraine headaches.  If you feel this is related to muscle tension (a band-like feeling around the head, or a pressure at the low-back of the head) you may try ice or heat to this area.  You may need to drink more fluids (try electrolyte drink like Gatorade), rest, or take your usual migraine medications.   **If your headaches do not resolve, worsen, are accompanied by other symptoms, or if your blood pressure is high, please call your primary physician for  recommendation and/or examination.    Q:  I am unable to urinate.  What do I do?  A:  A small percentage of people can have difficulty urinating initially after surgery.  This includes being able to urinate only a very small amount at a time and feeling discomfort or pressure in the very low abdomen.  This is called  urinary retention , and is actually an urgent situation.  Proceed to your nearest Emergency department for evaluation (not an Urgent Care Center).  Sometimes the bladder does not work correctly after certain medications you receive during surgery, or related to certain procedures.  You may need to have a catheter placed until your bladder recovers.  When planning to go to an Emergency department, it may help to call the ER to let them know you are coming in for this problem after a surgery.  This may help you get in quicker to be evaluated.  **If you have symptoms of a urinary tract infection, please contact your primary physician for the proper evaluation and treatment.          If you have other questions, please call the office Monday thru Friday between 8am and 5pm to discuss with the nurse or physician assistant.  #[phone]    There is a surgeon ON CALL on weekday evenings and over the weekend in case of urgent need only, and may be contacted at the same number.    If you are having an emergency, call 911 or proceed to your nearest emergency department.        YOU  [facility] AT 9:15 AM.

## 2020-01-03 NOTE — OP NOTE
General Surgery Operative Note    Pre-operative diagnosis:  Malignant neoplasm of right female breast, unspecified estrogen receptor status, unspecified site of breast (H) [C50.911] with close inferior and medial margins for pleomorphic LCIS   Post-operative diagnosis:  Same   Procedure:  Reexcision inferior and medial margins of lumpectomy site   Surgeon: Giles Ambriz MD   Assistant(s): Kathy Wilkinson PA-C The physicians assistant was medically necessary for their expertise in retraction, suturing, hemostasis, and suctioning.     Anesthesia: General    Estimated blood loss: 5 cc's   Drains placed: None   Complications:  None   Findings:     Specimens:   ID Type Source Tests Collected by Time Destination   A : Right Breast Skin Scar Tissue Breast, Right SURGICAL PATHOLOGY EXAM Giles Ambriz MD 1/3/2020  7:59 AM    B : Right Breast inferior Margin Suture marks new margin Tissue Breast, Right SURGICAL PATHOLOGY EXAM Giles Ambriz MD 1/3/2020  8:00 AM    C : Right Breast Medial Margin, suture marks new margin Tissue Breast, Right SURGICAL PATHOLOGY EXAM Giles Ambriz MD 1/3/2020  8:01 AM      Indications: This 49-year-old female underwent lumpectomy for treatment of invasive cancer.  Margins for invasive cancer and DCIS are both negative however she does have multifocal pleomorphic LCIS and these were noted to be at 1 mm from both the medial and inferior margin of the lumpectomy site.  After discussion at the multi disciplinary breast cancer conference, it was recommended that she undergo reexcision of these margins.  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  She now presents for the above-mentioned surgery after discussing the procedure its risks benefits and complications.  She seems understand all the issues quite well and specifically requests that she be given no additional postop pain medications as she still has a supply at home from her prior  surgery.    Description: Patient is brought to the operating room, placed in a supine position on the operating room table and after induction of anesthetic, sterile prep and drape were performed.  Pause is performed, the site had been marked with her in preinduction.  The previous incision is excised and the scar is followed down to the lumpectomy cavity which is almost completely resolved.  Both the inferior and medial aspects of the lumpectomy cavity are excised.  This included excision of the superficial and deep portions of the inferior and deep margins.  A suture was placed on the new margin to orient pathology.  New clips are placed at the inferior and medial margin of the lumpectomy cavity.  We then carefully checked to assure complete hemostasis.  There was no palpable abnormality other than some scar in the superior and lateral aspects of the lumpectomy cavity.  Marcaine was instilled for postop pain relief and the subcutaneous tissues and deep dermis were closed with interrupted 3-0 Vicryl skin was then closed with subcuticular Vicryl followed by dressings.  She is then returned to the recovery room in excellent condition with all sponge and needle counts correct, having tolerated the procedure well.    Giles Ambriz MD

## 2020-01-03 NOTE — ANESTHESIA CARE TRANSFER NOTE
Patient: Alejandrina Mack    Procedure(s):  RE-EXCISION INFERIOR AND MEDIAL MARGINS OF RIGHT BREAST LUMPECTOMY SITE    Diagnosis: Malignant neoplasm of right female breast, unspecified estrogen receptor status, unspecified site of breast (H) [C50.911]  Diagnosis Additional Information: No value filed.    Anesthesia Type:   General, LMA     Note:  Airway :Face Mask  Patient transferred to:PACU  Handoff Report: Identifed the Patient, Identified the Reponsible Provider, Reviewed the pertinent medical history, Discussed the surgical course, Reviewed Intra-OP anesthesia mangement and issues during anesthesia, Set expectations for post-procedure period and Allowed opportunity for questions and acknowledgement of understanding      Vitals: (Last set prior to Anesthesia Care Transfer)    [facility] VITALS  1/3/2020 0753 - 1/3/2020 0828      1/3/2020             Pulse:  109    SpO2:  97 %                Electronically Signed By: DANIELLA Hudson CRNA  January 3, 2020  8:28 AM

## 2020-01-03 NOTE — ANESTHESIA PREPROCEDURE EVALUATION
Anesthesia Pre-Procedure Evaluation    Patient: Alejandrina Mack   MRN: [id number] : 1970          Preoperative Diagnosis: Malignant neoplasm of right female breast, unspecified estrogen receptor status, unspecified site of breast (H) [C50.911]    Procedure(s):  RE-EXCISION INFERIOR AND MEDIAL MARGINS OF RIGHT BREAST LUMPECTOMY SITE    Past Medical History:   Diagnosis Date     Bipolar 1 disorder, depressed (H)      Bipolar 2 disorder (H)      Seasonal affective disorder (H)      Sleep apnea     CPAP     Past Surgical History:   Procedure Laterality Date     LUMPECTOMY BREAST, SEED LOCALIZATION, SENTINEL NODE Right 10/11/2019    Procedure: RIGHT BREAST SEED LOCALIZED LUMPECTOMY WITH RIGHT SENTINEL NODE BIOPSY;  Surgeon: Giles Ambriz MD;  Location: [facility]     Anesthesia Evaluation     . Pt has had prior anesthetic. Type: General    No history of anesthetic complications          ROS/MED HX    ENT/Pulmonary:     (+)sleep apnea, uses CPAP , . .    Neurologic:      (-) Delerium and Dementia   Cardiovascular:        (-) hypertension, CAD, CHF, arrhythmias, pulmonary hypertension and dyslipidemia   METS/Exercise Tolerance:     Hematologic:        (-) anemia   Musculoskeletal:        (-) arthritis   GI/Hepatic:        (-) GERD and hiatal hernia   Renal/Genitourinary:      (-) renal disease   Endo:     (+) Obesity, .   (-) Type I DM, Type II DM, thyroid disease, chronic steroid usage and other endocrine disorder   Psychiatric:     (+) psychiatric history anxiety, depression and bipolar      Infectious Disease:  - neg infectious disease ROS       Malignancy:   (+) Malignancy History of Breast          Other:    - neg other ROS                      Physical Exam      Airway   Mallampati: II  TM distance: >3 FB  Neck ROM: full    Dental     Cardiovascular   Rhythm and rate: regular and normal  (-) no murmur    Pulmonary    breath sounds clear to auscultation    Other findings: No lab results found.   No lab results  "found.        Lab Results   Component Value Date    HCG Negative 10/11/2019       Preop Vitals  BP Readings from Last 3 Encounters:   01/03/20 (!) 147/85   10/24/19 (!) 134/96   10/11/19 (!) 142/88    Pulse Readings from Last 3 Encounters:   10/24/19 106   10/11/19 94   09/30/19 106      Resp Readings from Last 3 Encounters:   01/03/20 12   10/24/19 16   10/11/19 14    SpO2 Readings from Last 3 Encounters:   01/03/20 95%   10/24/19 95%   10/11/19 98%      Temp Readings from Last 1 Encounters:   01/03/20 97.8  F (36.6  C) (Temporal)    Ht Readings from Last 1 Encounters:   01/03/20 1.778 m (5' 10\")      Wt Readings from Last 1 Encounters:   01/03/20 122.5 kg (270 lb)    Estimated body mass index is 38.74 kg/m  as calculated from the following:    Height as of this encounter: 1.778 m (5' 10\").    Weight as of this encounter: 122.5 kg (270 lb).       Anesthesia Plan      History & Physical Review  History and physical reviewed and following examination; no interval change.    ASA Status:  2 .    NPO Status:  > 8 hours    Plan for General and LMA with Propofol induction. Maintenance will be Balanced.      Propofol gtt + Sevo + 60% O2 please      Postoperative Care  Postoperative pain management:  IV analgesics and Oral pain medications.      Consents  Anesthetic plan, risks, benefits and alternatives discussed with:  Patient..                 Carlo Christianson MD                    .  "

## 2020-01-06 LAB — COPATH REPORT: NORMAL

## 2020-01-06 NOTE — RESULT ENCOUNTER NOTE
Encounter addended by: Carissa Medina PT on: 9/30/2019 12:16 PM   Actions taken: Sign clinical note Patient was notified of these results.  Discussed both with her and her oncologist.  Plan is for continuation of her treatment including hormonal and radiation therapy followed by surveillance.  Giles Ambriz MD  1/6/2020 4:24 PM

## 2020-01-23 ENCOUNTER — OFFICE VISIT (OUTPATIENT)
Dept: SURGERY | Facility: CLINIC | Age: 50
End: 2020-01-23
Payer: COMMERCIAL

## 2020-01-23 VITALS
SYSTOLIC BLOOD PRESSURE: 128 MMHG | HEART RATE: 100 BPM | BODY MASS INDEX: 38.65 KG/M2 | HEIGHT: 70 IN | OXYGEN SATURATION: 91 % | WEIGHT: 270 LBS | DIASTOLIC BLOOD PRESSURE: 76 MMHG

## 2020-01-23 DIAGNOSIS — Z09 SURGICAL FOLLOWUP VISIT: Primary | ICD-10-CM

## 2020-01-23 PROCEDURE — 99024 POSTOP FOLLOW-UP VISIT: CPT | Performed by: SURGERY

## 2020-01-23 ASSESSMENT — MIFFLIN-ST. JEOR: SCORE: 1934.96

## 2020-01-23 NOTE — PROGRESS NOTES
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  The 2 margins resected show no evidence of residual carcinoma however, excision of the skin scar and underlying subcutaneous and breast tissue did show additional foci of both classic and pleomorphic LCIS with negative margins.  This was discussed with her oncologist who felt that additional resection was unnecessary.  She reports doing very well after her reexcision with no complaints of pain or other problems.    Exam: Incision is healing quite nicely there is no sign of infection.  There is minimal subcutaneous induration.  I cannot palpate a seroma or hematoma.    Impression: Negative margins after reexcision    Plan: She is scheduled for mapping with radiation therapy later today.  She will be followed by Dr. Shah and be placed on a surveillance program.    Giles Ambriz MD  1/23/2020 2:56 PM

## 2020-01-23 NOTE — LETTER
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  The 2 margins resected show no evidence of residual carcinoma however, excision of the skin scar and underlying subcutaneous and breast tissue did show additional foci of both classic and pleomorphic LCIS with negative margins.  This was discussed with her oncologist who felt that additional resection was unnecessary.  She reports doing very well after her reexcision with no complaints of pain or other problems.     Exam: Incision is healing quite nicely there is no sign of infection.  There is minimal subcutaneous induration.  I cannot palpate a seroma or hematoma.     Impression: Negative margins after reexcision     Plan: She is scheduled for mapping with radiation therapy later today.  She will be followed by Dr. Shah and be placed on a surveillance program.     Giles Ambriz MD

## 2020-03-09 ENCOUNTER — TRANSFERRED RECORDS (OUTPATIENT)
Dept: SURGERY | Facility: CLINIC | Age: 50
End: 2020-03-09

## 2020-11-16 ENCOUNTER — HEALTH MAINTENANCE LETTER (OUTPATIENT)
Age: 50
End: 2020-11-16

## 2021-02-07 ENCOUNTER — HEALTH MAINTENANCE LETTER (OUTPATIENT)
Age: 51
End: 2021-02-07

## 2021-03-26 ENCOUNTER — IMMUNIZATION (OUTPATIENT)
Dept: NURSING | Facility: CLINIC | Age: 51
End: 2021-03-26
Payer: COMMERCIAL

## 2021-03-26 PROCEDURE — 0001A PR COVID VAC PFIZER DIL RECON 30 MCG/0.3 ML IM: CPT

## 2021-03-26 PROCEDURE — 91300 PR COVID VAC PFIZER DIL RECON 30 MCG/0.3 ML IM: CPT

## 2021-04-09 ENCOUNTER — HOSPITAL ENCOUNTER (OUTPATIENT)
Dept: MRI IMAGING | Facility: CLINIC | Age: 51
Discharge: HOME | End: 2021-04-09
Attending: INTERNAL MEDICINE | Admitting: INTERNAL MEDICINE
Payer: COMMERCIAL

## 2021-04-09 DIAGNOSIS — C50.919 BREAST CANCER (H): ICD-10-CM

## 2021-04-09 PROCEDURE — 255N000002 HC RX 255 OP 636: Performed by: INTERNAL MEDICINE

## 2021-04-09 PROCEDURE — A9585 GADOBUTROL INJECTION: HCPCS | Performed by: INTERNAL MEDICINE

## 2021-04-09 PROCEDURE — 77049 MRI BREAST C-+ W/CAD BI: CPT

## 2021-04-09 RX ORDER — GADOBUTROL 604.72 MG/ML
15 INJECTION INTRAVENOUS ONCE
Status: COMPLETED | OUTPATIENT
Start: 2021-04-09 | End: 2021-04-09

## 2021-04-09 RX ADMIN — GADOBUTROL 12 ML: 604.72 INJECTION INTRAVENOUS at 12:51

## 2021-04-16 ENCOUNTER — IMMUNIZATION (OUTPATIENT)
Dept: NURSING | Facility: CLINIC | Age: 51
End: 2021-04-16
Attending: INTERNAL MEDICINE
Payer: COMMERCIAL

## 2021-04-16 PROCEDURE — 91300 PR COVID VAC PFIZER DIL RECON 30 MCG/0.3 ML IM: CPT

## 2021-04-16 PROCEDURE — 0002A PR COVID VAC PFIZER DIL RECON 30 MCG/0.3 ML IM: CPT

## 2021-09-18 ENCOUNTER — HEALTH MAINTENANCE LETTER (OUTPATIENT)
Age: 51
End: 2021-09-18

## 2022-01-08 ENCOUNTER — HEALTH MAINTENANCE LETTER (OUTPATIENT)
Age: 52
End: 2022-01-08

## 2022-03-05 ENCOUNTER — HEALTH MAINTENANCE LETTER (OUTPATIENT)
Age: 52
End: 2022-03-05

## 2022-11-20 ENCOUNTER — HEALTH MAINTENANCE LETTER (OUTPATIENT)
Age: 52
End: 2022-11-20

## 2023-04-15 ENCOUNTER — HEALTH MAINTENANCE LETTER (OUTPATIENT)
Age: 53
End: 2023-04-15

## 2025-06-21 ENCOUNTER — HEALTH MAINTENANCE LETTER (OUTPATIENT)
Age: 55
End: 2025-06-21

## (undated) DEVICE — LINEN HALF SHEET 5512

## (undated) DEVICE — SU VICRYL 3-0 SH 27" UND J416H

## (undated) DEVICE — DRAPE LAP W/ARMBOARD 29410

## (undated) DEVICE — TUBING SMOKE EVAC ATTACHMENT E3590

## (undated) DEVICE — BLADE CLIPPER 3M 9670

## (undated) DEVICE — PAD CHUX UNDERPAD 23X24" 7136

## (undated) DEVICE — SYR 03ML LL W/O NDL 309657

## (undated) DEVICE — SU VICRYL 4-0 PS-2 18" UND J496H

## (undated) DEVICE — LINEN FULL SHEET 5511

## (undated) DEVICE — LINEN TOWEL PACK X10 5473

## (undated) DEVICE — TUBING SUCTION 12"X1/4" N612

## (undated) DEVICE — PREP SCRUB SOL EXIDINE 4% CHG 4OZ 29002-404

## (undated) DEVICE — LINEN TOWEL PACK X5 5464

## (undated) DEVICE — SU SILK 3-0 SH 30" K832H

## (undated) DEVICE — PREP SKIN SCRUB TRAY 4461A

## (undated) DEVICE — NDL 18GA 1.5" 305196

## (undated) DEVICE — SUCTION CANISTER MEDIVAC LINER 3000ML W/LID 65651-530

## (undated) DEVICE — GLOVE PROTEXIS W/NEU-THERA 7.5  2D73TE75

## (undated) DEVICE — SOL NACL 0.9% IRRIG 1000ML BOTTLE 2F7124

## (undated) DEVICE — NDL 27GA 1.25" 305136

## (undated) DEVICE — GLOVE PROTEXIS BLUE W/NEU-THERA 8.0  2D73EB80

## (undated) DEVICE — BAG RED BIOHAZARD 37X50" 40GAL A7450PR

## (undated) DEVICE — PACK MINOR CUSTOM RIDGES SBA32RMRMA

## (undated) DEVICE — BAG CLEAR TRASH 1.3M 39X33" P4040C

## (undated) DEVICE — SU VICRYL 3-0 SH 27" J316H

## (undated) DEVICE — SUCTION TIP YANKAUER W/O VENT K86

## (undated) DEVICE — SLEEVE PROTECTIVE BREAST BIOPSY  GMSLV001-10

## (undated) DEVICE — ESU GROUND PAD ADULT W/CORD E7507

## (undated) DEVICE — SU VICRYL 3-0 TIE 12X18" J904T

## (undated) DEVICE — CLIP ETHICON LIGACLIP MED WHITE LT200

## (undated) DEVICE — SYR EAR BULB 3OZ 0035830

## (undated) DEVICE — PREFILTER SMOKE EVAC E6330

## (undated) DEVICE — LIGHT HANDLE X2

## (undated) RX ORDER — PROPOFOL 10 MG/ML
INJECTION, EMULSION INTRAVENOUS
Status: DISPENSED
Start: 2019-10-11

## (undated) RX ORDER — DEXAMETHASONE SODIUM PHOSPHATE 4 MG/ML
INJECTION, SOLUTION INTRA-ARTICULAR; INTRALESIONAL; INTRAMUSCULAR; INTRAVENOUS; SOFT TISSUE
Status: DISPENSED
Start: 2019-10-11

## (undated) RX ORDER — LIDOCAINE HYDROCHLORIDE 10 MG/ML
INJECTION, SOLUTION EPIDURAL; INFILTRATION; INTRACAUDAL; PERINEURAL
Status: DISPENSED
Start: 2019-10-11

## (undated) RX ORDER — ONDANSETRON 2 MG/ML
INJECTION INTRAMUSCULAR; INTRAVENOUS
Status: DISPENSED
Start: 2020-01-03

## (undated) RX ORDER — BUPIVACAINE HYDROCHLORIDE AND EPINEPHRINE 2.5; 5 MG/ML; UG/ML
INJECTION, SOLUTION EPIDURAL; INFILTRATION; INTRACAUDAL; PERINEURAL
Status: DISPENSED
Start: 2019-10-11

## (undated) RX ORDER — PROPOFOL 10 MG/ML
INJECTION, EMULSION INTRAVENOUS
Status: DISPENSED
Start: 2020-01-03

## (undated) RX ORDER — FENTANYL CITRATE 50 UG/ML
INJECTION, SOLUTION INTRAMUSCULAR; INTRAVENOUS
Status: DISPENSED
Start: 2020-01-03

## (undated) RX ORDER — DEXAMETHASONE SODIUM PHOSPHATE 4 MG/ML
INJECTION, SOLUTION INTRA-ARTICULAR; INTRALESIONAL; INTRAMUSCULAR; INTRAVENOUS; SOFT TISSUE
Status: DISPENSED
Start: 2020-01-03

## (undated) RX ORDER — FENTANYL CITRATE 50 UG/ML
INJECTION, SOLUTION INTRAMUSCULAR; INTRAVENOUS
Status: DISPENSED
Start: 2019-10-11

## (undated) RX ORDER — IBUPROFEN 600 MG/1
TABLET, FILM COATED ORAL
Status: DISPENSED
Start: 2020-01-03

## (undated) RX ORDER — CEFAZOLIN SODIUM IN 0.9 % NACL 3 G/100 ML
INTRAVENOUS SOLUTION, PIGGYBACK (ML) INTRAVENOUS
Status: DISPENSED
Start: 2020-01-03

## (undated) RX ORDER — CEFAZOLIN SODIUM IN 0.9 % NACL 3 G/100 ML
INTRAVENOUS SOLUTION, PIGGYBACK (ML) INTRAVENOUS
Status: DISPENSED
Start: 2019-10-11

## (undated) RX ORDER — BUPIVACAINE HYDROCHLORIDE AND EPINEPHRINE 2.5; 5 MG/ML; UG/ML
INJECTION, SOLUTION EPIDURAL; INFILTRATION; INTRACAUDAL; PERINEURAL
Status: DISPENSED
Start: 2020-01-03

## (undated) RX ORDER — LIDOCAINE HYDROCHLORIDE 10 MG/ML
INJECTION, SOLUTION EPIDURAL; INFILTRATION; INTRACAUDAL; PERINEURAL
Status: DISPENSED
Start: 2020-01-03

## (undated) RX ORDER — OXYCODONE HYDROCHLORIDE 5 MG/1
TABLET ORAL
Status: DISPENSED
Start: 2019-10-11

## (undated) RX ORDER — GLYCOPYRROLATE 0.2 MG/ML
INJECTION INTRAMUSCULAR; INTRAVENOUS
Status: DISPENSED
Start: 2020-01-03

## (undated) RX ORDER — KETOROLAC TROMETHAMINE 30 MG/ML
INJECTION, SOLUTION INTRAMUSCULAR; INTRAVENOUS
Status: DISPENSED
Start: 2019-10-11